# Patient Record
Sex: MALE | Race: WHITE | NOT HISPANIC OR LATINO | Employment: UNEMPLOYED | URBAN - METROPOLITAN AREA
[De-identification: names, ages, dates, MRNs, and addresses within clinical notes are randomized per-mention and may not be internally consistent; named-entity substitution may affect disease eponyms.]

---

## 2023-02-22 LAB
ALANINE AMINOTRANSFERASE (SGPT) (U/L) IN SER/PLAS: 122 U/L (ref 10–52)
ALBUMIN (G/DL) IN SER/PLAS: 4.5 G/DL (ref 3.4–5)
ALKALINE PHOSPHATASE (U/L) IN SER/PLAS: 69 U/L (ref 33–120)
ANION GAP IN SER/PLAS: 10 MMOL/L (ref 10–20)
ASPARTATE AMINOTRANSFERASE (SGOT) (U/L) IN SER/PLAS: 62 U/L (ref 9–39)
BASOPHILS (10*3/UL) IN BLOOD BY AUTOMATED COUNT: 0.06 X10E9/L (ref 0–0.1)
BASOPHILS/100 LEUKOCYTES IN BLOOD BY AUTOMATED COUNT: 0.8 % (ref 0–2)
BILIRUBIN TOTAL (MG/DL) IN SER/PLAS: 1.8 MG/DL (ref 0–1.2)
CALCIDIOL (25 OH VITAMIN D3) (NG/ML) IN SER/PLAS: 20 NG/ML
CALCIUM (MG/DL) IN SER/PLAS: 9.5 MG/DL (ref 8.6–10.3)
CARBON DIOXIDE, TOTAL (MMOL/L) IN SER/PLAS: 29 MMOL/L (ref 21–32)
CHLORIDE (MMOL/L) IN SER/PLAS: 103 MMOL/L (ref 98–107)
CHOLESTEROL (MG/DL) IN SER/PLAS: 135 MG/DL (ref 0–199)
CHOLESTEROL IN HDL (MG/DL) IN SER/PLAS: 32.1 MG/DL
CHOLESTEROL/HDL RATIO: 4.2
COBALAMIN (VITAMIN B12) (PG/ML) IN SER/PLAS: 609 PG/ML (ref 211–911)
CREATININE (MG/DL) IN SER/PLAS: 0.89 MG/DL (ref 0.5–1.3)
EOSINOPHILS (10*3/UL) IN BLOOD BY AUTOMATED COUNT: 0.22 X10E9/L (ref 0–0.7)
EOSINOPHILS/100 LEUKOCYTES IN BLOOD BY AUTOMATED COUNT: 3 % (ref 0–6)
ERYTHROCYTE DISTRIBUTION WIDTH (RATIO) BY AUTOMATED COUNT: 13.2 % (ref 11.5–14.5)
ERYTHROCYTE MEAN CORPUSCULAR HEMOGLOBIN CONCENTRATION (G/DL) BY AUTOMATED: 33.9 G/DL (ref 32–36)
ERYTHROCYTE MEAN CORPUSCULAR VOLUME (FL) BY AUTOMATED COUNT: 83 FL (ref 80–100)
ERYTHROCYTES (10*6/UL) IN BLOOD BY AUTOMATED COUNT: 5.87 X10E12/L (ref 4.5–5.9)
GFR MALE: >90 ML/MIN/1.73M2
GLUCOSE (MG/DL) IN SER/PLAS: 99 MG/DL (ref 74–99)
HEMATOCRIT (%) IN BLOOD BY AUTOMATED COUNT: 48.7 % (ref 41–52)
HEMOGLOBIN (G/DL) IN BLOOD: 16.5 G/DL (ref 13.5–17.5)
IMMATURE GRANULOCYTES/100 LEUKOCYTES IN BLOOD BY AUTOMATED COUNT: 0.1 % (ref 0–0.9)
LDL: 62 MG/DL (ref 0–109)
LEUKOCYTES (10*3/UL) IN BLOOD BY AUTOMATED COUNT: 7.4 X10E9/L (ref 4.4–11.3)
LYMPHOCYTES (10*3/UL) IN BLOOD BY AUTOMATED COUNT: 2.13 X10E9/L (ref 1.2–4.8)
LYMPHOCYTES/100 LEUKOCYTES IN BLOOD BY AUTOMATED COUNT: 28.9 % (ref 13–44)
MONOCYTES (10*3/UL) IN BLOOD BY AUTOMATED COUNT: 0.8 X10E9/L (ref 0.1–1)
MONOCYTES/100 LEUKOCYTES IN BLOOD BY AUTOMATED COUNT: 10.8 % (ref 2–10)
NEUTROPHILS (10*3/UL) IN BLOOD BY AUTOMATED COUNT: 4.16 X10E9/L (ref 1.2–7.7)
NEUTROPHILS/100 LEUKOCYTES IN BLOOD BY AUTOMATED COUNT: 56.4 % (ref 40–80)
NON HDL CHOLESTEROL: 103 MG/DL (ref 0–119)
PLATELETS (10*3/UL) IN BLOOD AUTOMATED COUNT: 326 X10E9/L (ref 150–450)
POTASSIUM (MMOL/L) IN SER/PLAS: 3.8 MMOL/L (ref 3.5–5.3)
PROTEIN TOTAL: 7.5 G/DL (ref 6.4–8.2)
SODIUM (MMOL/L) IN SER/PLAS: 138 MMOL/L (ref 136–145)
THYROTROPIN (MIU/L) IN SER/PLAS BY DETECTION LIMIT <= 0.05 MIU/L: 3.2 MIU/L (ref 0.44–3.98)
TRIGLYCERIDE (MG/DL) IN SER/PLAS: 203 MG/DL (ref 0–149)
UREA NITROGEN (MG/DL) IN SER/PLAS: 16 MG/DL (ref 6–23)
VLDL: 41 MG/DL (ref 0–40)

## 2023-03-10 LAB
BILIRUBIN DIRECT (MG/DL) IN SER/PLAS: 0.2 MG/DL (ref 0–0.3)
FERRITIN (UG/LL) IN SER/PLAS: 167 UG/L (ref 20–300)
HEPATITIS A VIRUS IGM AB PRESENCE IN SER/PLAS BY IMMUNOASSAY: NONREACTIVE
HEPATITIS B VIRUS CORE IGM AB PRESENCE IN SER/PLAS BY IMMUNOASSY: NONREACTIVE
HEPATITIS B VIRUS SURFACE AB (MIU/ML) IN SERUM: <3.1 MIU/ML
HEPATITIS B VIRUS SURFACE AG PRESENCE IN SERUM: NONREACTIVE
HEPATITIS C VIRUS AB PRESENCE IN SERUM: NONREACTIVE
IRON (UG/DL) IN SER/PLAS: 91 UG/DL (ref 35–150)
IRON BINDING CAPACITY (UG/DL) IN SER/PLAS: 313 UG/DL (ref 240–445)
IRON SATURATION (%) IN SER/PLAS: 29 % (ref 25–45)

## 2023-03-13 LAB
AMPHETAMINE (PRESENCE) IN URINE BY SCREEN METHOD: NORMAL
AMPHETAMINES,URINE: NORMAL
BARBITURATES PRESENCE IN URINE BY SCREEN METHOD: NORMAL
BENZODIAZEPINE (PRESENCE) IN URINE BY SCREEN METHOD: NORMAL
CANNABINOIDS IN URINE BY SCREEN METHOD: NORMAL
COCAINE (PRESENCE) IN URINE BY SCREEN METHOD: NORMAL
DRUG SCREEN COMMENT URINE: NORMAL
FENTANYL URINE: NORMAL
MDA,URINE: NORMAL
MDEA,URINE: NORMAL
MDMA,UR: NORMAL
METHADONE (PRESENCE) IN URINE BY SCREEN METHOD: NORMAL
METHAMPHETAMINE QUANTITATIVE URINE: NORMAL
METHYLPHENIDATE URINE QUANTITATIVE: NORMAL
OPIATES (PRESENCE) IN URINE BY SCREEN METHOD: NORMAL
OXYCODONE (PRESENCE) IN URINE BY SCREEN METHOD: NORMAL
PHENCYCLIDINE (PRESENCE) IN URINE BY SCREEN METHOD: NORMAL
PHENTERMINE,UR: NORMAL
RITALINIC ACID URINE: NORMAL

## 2023-03-16 ENCOUNTER — TELEPHONE (OUTPATIENT)
Dept: PRIMARY CARE | Facility: CLINIC | Age: 23
End: 2023-03-16
Payer: COMMERCIAL

## 2023-03-16 DIAGNOSIS — F90.9 ATTENTION DEFICIT HYPERACTIVITY DISORDER (ADHD), UNSPECIFIED ADHD TYPE: ICD-10-CM

## 2023-03-16 DIAGNOSIS — F84.5 ASPERGER'S DISORDER (HHS-HCC): ICD-10-CM

## 2023-03-16 DIAGNOSIS — K76.0 NON-ALCOHOLIC FATTY LIVER DISEASE: Primary | ICD-10-CM

## 2023-03-27 NOTE — TELEPHONE ENCOUNTER
Pts Mom wants Dr MARTIN to know that the drug test was canceled.  We don't know if it was because these drugs will not show in the urine or what happened.  Do you want to try again?

## 2023-03-28 ENCOUNTER — TELEPHONE (OUTPATIENT)
Dept: PRIMARY CARE | Facility: CLINIC | Age: 23
End: 2023-03-28
Payer: COMMERCIAL

## 2023-03-28 NOTE — TELEPHONE ENCOUNTER
----- Message from John Gaspar MD sent at 3/20/2023  9:49 PM EDT -----  Liver US: significant fatty infiltration of the liver is noted, Hepatitis B surface antibody is negative. Clarify if the patient was vaccinated for hepatitis B. Will refer for evaluation of fatty liver since the patient is only 23 years old.

## 2023-04-28 DIAGNOSIS — F90.0 ATTENTION DEFICIT HYPERACTIVITY DISORDER (ADHD), PREDOMINANTLY INATTENTIVE TYPE: Primary | ICD-10-CM

## 2023-05-01 PROBLEM — F90.0 ATTENTION DEFICIT HYPERACTIVITY DISORDER (ADHD), PREDOMINANTLY INATTENTIVE TYPE: Status: ACTIVE | Noted: 2023-05-01

## 2023-05-01 PROBLEM — E66.3 OVERWEIGHT (BMI 25.0-29.9): Status: ACTIVE | Noted: 2023-05-01

## 2023-05-01 PROBLEM — F84.5 ASPERGER'S DISORDER (HHS-HCC): Status: ACTIVE | Noted: 2023-05-01

## 2023-05-01 RX ORDER — DEXMETHYLPHENIDATE HYDROCHLORIDE 10 MG/1
TABLET ORAL
COMMUNITY
Start: 2022-09-01 | End: 2023-05-04 | Stop reason: SDUPTHER

## 2023-05-04 RX ORDER — DEXMETHYLPHENIDATE HYDROCHLORIDE 10 MG/1
TABLET ORAL
Qty: 60 TABLET | Refills: 0 | Status: SHIPPED | OUTPATIENT
Start: 2023-05-04 | End: 2023-06-09

## 2023-05-04 NOTE — TELEPHONE ENCOUNTER
Spoke with patient Mom , patient is on 10 mg of the Focalin.  Patient has been out of med for 3 days.   Drug screen was canceled ,in March, not sure why. She will get patient to the lab to do drug screen as soon as she can , most likely Saturday.    Mom would like to know will this effect the chance of patient getting med refilled.

## 2023-06-07 ENCOUNTER — LAB (OUTPATIENT)
Dept: LAB | Facility: LAB | Age: 23
End: 2023-06-07
Payer: COMMERCIAL

## 2023-06-07 DIAGNOSIS — F84.5 ASPERGER'S DISORDER (HHS-HCC): ICD-10-CM

## 2023-06-07 DIAGNOSIS — F90.9 ATTENTION DEFICIT HYPERACTIVITY DISORDER (ADHD), UNSPECIFIED ADHD TYPE: ICD-10-CM

## 2023-06-07 PROCEDURE — 80324 DRUG SCREEN AMPHETAMINES 1/2: CPT

## 2023-06-07 PROCEDURE — 80360 METHYLPHENIDATE: CPT

## 2023-06-08 DIAGNOSIS — F90.0 ATTENTION DEFICIT HYPERACTIVITY DISORDER (ADHD), PREDOMINANTLY INATTENTIVE TYPE: Primary | ICD-10-CM

## 2023-06-09 RX ORDER — DEXMETHYLPHENIDATE HYDROCHLORIDE 10 MG/1
TABLET ORAL
Qty: 50 TABLET | Refills: 0 | Status: SHIPPED | OUTPATIENT
Start: 2023-06-09 | End: 2023-07-14 | Stop reason: SDUPTHER

## 2023-06-10 LAB
AMPHETAMINES,URINE: <50 NG/ML
MDA,URINE: <200 NG/ML
MDEA,URINE: <200 NG/ML
MDMA,UR: <200 NG/ML
METHAMPHETAMINE QUANTITATIVE URINE: <200 NG/ML
PHENTERMINE,UR: <200 NG/ML

## 2023-06-11 LAB
METHYLPHENIDATE URINE QUANTITATIVE: 874.4 NG/ML
RITALINIC ACID URINE: 3612.2 NG/ML

## 2023-07-06 LAB
ALANINE AMINOTRANSFERASE (SGPT) (U/L) IN SER/PLAS: 87 U/L (ref 10–52)
ALBUMIN (G/DL) IN SER/PLAS: 4.7 G/DL (ref 3.4–5)
ALKALINE PHOSPHATASE (U/L) IN SER/PLAS: 67 U/L (ref 33–120)
ASPARTATE AMINOTRANSFERASE (SGOT) (U/L) IN SER/PLAS: 42 U/L (ref 9–39)
BILIRUBIN DIRECT (MG/DL) IN SER/PLAS: 0.2 MG/DL (ref 0–0.3)
BILIRUBIN TOTAL (MG/DL) IN SER/PLAS: 1.1 MG/DL (ref 0–1.2)
CERULOPLASMIN (MG/DL) IN SER/PLAS: 18 MG/DL (ref 20–60)
HEPATITIS A TOTAL AB INTERPRETATION: REACTIVE
PROTEIN TOTAL: 7.7 G/DL (ref 6.4–8.2)

## 2023-07-07 LAB
ANTI-NUCLEAR ANTIBODY (ANA): NEGATIVE
MITOCHONDRIAL ANTIBODY: NEGATIVE

## 2023-07-10 LAB
ALPHA-1 ANTITRYPSIN PHENOTYPE: NORMAL
ALPHA-1-ANTITRYPSIN (REF): 110 MG/DL (ref 90–200)
ANTI-SMOOTH MUSCLE ANTIBODY: ABNORMAL

## 2023-07-13 ENCOUNTER — TELEPHONE (OUTPATIENT)
Dept: PRIMARY CARE | Facility: CLINIC | Age: 23
End: 2023-07-13
Payer: COMMERCIAL

## 2023-07-13 NOTE — TELEPHONE ENCOUNTER
Patient is requesting a refill of   dexmethylphenidate (Focalin) 10 mg tablet     Pharmacy is  [facility]

## 2023-07-14 DIAGNOSIS — F90.0 ATTENTION DEFICIT HYPERACTIVITY DISORDER (ADHD), PREDOMINANTLY INATTENTIVE TYPE: ICD-10-CM

## 2023-07-14 PROBLEM — K76.0 FATTY LIVER: Status: ACTIVE | Noted: 2023-07-14

## 2023-07-14 PROBLEM — E78.1 HYPERTRIGLYCERIDEMIA: Status: ACTIVE | Noted: 2023-07-14

## 2023-07-14 PROBLEM — R74.8 ELEVATED LIVER ENZYMES: Status: ACTIVE | Noted: 2023-07-14

## 2023-07-14 RX ORDER — DEXMETHYLPHENIDATE HYDROCHLORIDE 10 MG/1
10 TABLET ORAL 2 TIMES DAILY
Qty: 50 TABLET | Refills: 0 | Status: SHIPPED | OUTPATIENT
Start: 2023-07-14 | End: 2023-07-28 | Stop reason: SDUPTHER

## 2023-07-20 LAB
COPPER, 24 HOUR URINE: 7 UG/24 HR (ref 3–35)
COPPER, URINE: 6 UG/L
COPPER/CREATININE RATIO URINE: 9 UG/G CREAT (ref 0–49)
CREATININE URINE (LC): 0.67 G/L (ref 0.3–3)
VOLUME OF URINE (LC): 1100 ML

## 2023-07-28 ENCOUNTER — OFFICE VISIT (OUTPATIENT)
Dept: PRIMARY CARE | Facility: CLINIC | Age: 23
End: 2023-07-28
Payer: COMMERCIAL

## 2023-07-28 ENCOUNTER — LAB (OUTPATIENT)
Dept: LAB | Facility: LAB | Age: 23
End: 2023-07-28
Payer: COMMERCIAL

## 2023-07-28 VITALS
BODY MASS INDEX: 29.29 KG/M2 | WEIGHT: 210 LBS | TEMPERATURE: 96.8 F | HEART RATE: 74 BPM | RESPIRATION RATE: 16 BRPM | DIASTOLIC BLOOD PRESSURE: 75 MMHG | SYSTOLIC BLOOD PRESSURE: 147 MMHG | OXYGEN SATURATION: 97 %

## 2023-07-28 DIAGNOSIS — F90.0 ATTENTION DEFICIT HYPERACTIVITY DISORDER (ADHD), PREDOMINANTLY INATTENTIVE TYPE: ICD-10-CM

## 2023-07-28 DIAGNOSIS — K76.0 FATTY LIVER: ICD-10-CM

## 2023-07-28 DIAGNOSIS — F84.5 ASPERGER'S DISORDER (HHS-HCC): ICD-10-CM

## 2023-07-28 DIAGNOSIS — E78.2 MIXED HYPERLIPIDEMIA: ICD-10-CM

## 2023-07-28 DIAGNOSIS — K76.0 FATTY LIVER: Primary | ICD-10-CM

## 2023-07-28 LAB
CHOLESTEROL (MG/DL) IN SER/PLAS: 150 MG/DL (ref 0–199)
CHOLESTEROL IN HDL (MG/DL) IN SER/PLAS: 34.6 MG/DL
CHOLESTEROL/HDL RATIO: 4.3
LDL: 89 MG/DL (ref 0–119)
NON HDL CHOLESTEROL: 115 MG/DL (ref 0–149)
TRIGLYCERIDE (MG/DL) IN SER/PLAS: 131 MG/DL (ref 0–149)
VLDL: 26 MG/DL (ref 0–40)

## 2023-07-28 PROCEDURE — 80061 LIPID PANEL: CPT

## 2023-07-28 PROCEDURE — 99214 OFFICE O/P EST MOD 30 MIN: CPT | Performed by: INTERNAL MEDICINE

## 2023-07-28 PROCEDURE — 36415 COLL VENOUS BLD VENIPUNCTURE: CPT

## 2023-07-28 PROCEDURE — 1036F TOBACCO NON-USER: CPT | Performed by: INTERNAL MEDICINE

## 2023-07-28 RX ORDER — DEXMETHYLPHENIDATE HYDROCHLORIDE 10 MG/1
10 TABLET ORAL 2 TIMES DAILY
Qty: 150 TABLET | Refills: 0 | Status: SHIPPED | OUTPATIENT
Start: 2023-08-11 | End: 2023-09-22 | Stop reason: SDUPTHER

## 2023-07-28 SDOH — ECONOMIC STABILITY: FOOD INSECURITY: WITHIN THE PAST 12 MONTHS, YOU WORRIED THAT YOUR FOOD WOULD RUN OUT BEFORE YOU GOT MONEY TO BUY MORE.: NEVER TRUE

## 2023-07-28 SDOH — ECONOMIC STABILITY: FOOD INSECURITY: WITHIN THE PAST 12 MONTHS, THE FOOD YOU BOUGHT JUST DIDN'T LAST AND YOU DIDN'T HAVE MONEY TO GET MORE.: NEVER TRUE

## 2023-07-28 ASSESSMENT — LIFESTYLE VARIABLES
HOW OFTEN DO YOU HAVE A DRINK CONTAINING ALCOHOL: NEVER
HOW OFTEN DO YOU HAVE SIX OR MORE DRINKS ON ONE OCCASION: NEVER
HOW MANY STANDARD DRINKS CONTAINING ALCOHOL DO YOU HAVE ON A TYPICAL DAY: PATIENT DOES NOT DRINK
AUDIT-C TOTAL SCORE: 0
SKIP TO QUESTIONS 9-10: 1

## 2023-07-28 ASSESSMENT — PAIN SCALES - GENERAL: PAINLEVEL: 0-NO PAIN

## 2023-07-28 ASSESSMENT — PATIENT HEALTH QUESTIONNAIRE - PHQ9
1. LITTLE INTEREST OR PLEASURE IN DOING THINGS: NOT AT ALL
SUM OF ALL RESPONSES TO PHQ9 QUESTIONS 1 & 2: 0
2. FEELING DOWN, DEPRESSED OR HOPELESS: NOT AT ALL

## 2023-07-28 NOTE — PROGRESS NOTES
Subjective   Patient ID: Hector Cruz is a 23 y.o. male who presents for ADHD.    HPI    Patient presents for a follow up visit. He was last seen in February 2023.     Asperger disorder, ADHD: patient used to see Santosh Ivy in peds psychiatry but he has aged out, he takes dexmethylphenidate twice daily.     Elevated BP: Previously concerned with elevated BP readings at psych office visits. BP today in the office is 147/75.      Elevated AST, ALT and bilirubin: Discovered on recent lab work with , AST 62, and bili 1.8. Rechecked on 7/6/23 which showed ALT 87, AST 42 and total bilirubin 1.1. Mother notes history of fatty liver that was diagnosed when he was young. He was previously on Abilify and other psych meds which were thought to be contributing to elevated liver enzymes in the past. However, these meds were discontinued. He was sent for a hepatitis panel as well as an ultrasound of the liver last visit. His hepatitis panel was nonreactive. Though his hep A total Ab was reactive on 7/6/23. Ultrasound showed hepatomegaly with significant fatty infiltration of the liver.  His iron studies were within normal limits. Patient has seen Dr Tovar x 1 (hepatology), he had an slightly low  ceruloplasmin, he has had an ophthalmology evaluation which showed no evidence of Patrick's disease. The patient has a fibro scan ordered per Dr Tovar prior to follow up with him.     Hypertriglyceridemia: TG was 203. Total cholesterol was 135 and LDL was 62. Fasting glucose was normal. Lipid panel has not been checked since that visit.     Anti-smooth muscle antibody positive: Recent lab work showed negative DENNISE and antimitochondrial antibody but positive anti-smooth muscle antibody.   He is following up with hepatology.      Vitamin D deficiency: Vitamin D level of 20. Advised to take supplements at last visit. He does not take vitamin d      Review of Systems  In addition to that documented in the HPI above, the  additional ROS was obtained:  Constitutional: Denies fevers or chills  Eyes: Denies vision changes  ENMT: Denies trouble swallowing  Cardiovascular: Denies chest pain or heart racing  Respiratory: Denies SOB or cough  Gastrointestinal: Denies nausea, vomiting, diarrhea or constipation  Musculoskeletal: Denies joint pain or joint swelling  Neuro: denies frequent headaches or dizziness  Psych: denies depression or anxiety , history of Asperger's and ADHD, takes Focalin     Objective     Visit Vitals  /75 (BP Location: Left arm, Patient Position: Sitting, BP Cuff Size: Adult)   Pulse 74   Temp 36 °C (96.8 °F) (Temporal)   Resp 16   Wt 95.3 kg (210 lb)   SpO2 97%   BMI 29.29 kg/m²   Smoking Status Never   BSA 2.18 m²      Physical Exam  CONSTITUTIONAL - well nourished, well developed, looks like stated age, in no acute distress, not ill-appearing, and not tired appearing, and accompanied by mother and grandmother  SKIN - normal skin color and pigmentation, normal skin turgor without rash, lesions, or nodules visualized  HEAD - no trauma, normocephalic  EYES - extraocular muscles are intact, and normal external exam, no icterus noted  NECK - supple without rigidity, no neck mass was observed, no thyromegaly or thyroid nodules, no neck masses  CHEST - clear to auscultation, no wheezing, no crackles and no rales, good effort  CARDIAC - regular rate and regular rhythm, no skipped beats, no murmur, no carotid bruits noted  EXTREMITIES - no edema, no deformities  NEUROLOGICAL -alert, oriented and no focal signs  PSYCHIATRIC - alert, pleasant and cordial, age-appropriate  IMMUNOLOGIC - no cervical lymphadenopathy    Health Maintenance Due   Topic Date Due    HIV Screening  Never done    Hearing Screening (1) Never done    Well Child Visit (WCV) - Annual  Never done    COVID-19 Vaccine (3 - Booster for Pfizer series) 10/05/2021        Assessment/Plan   Problem List Items Addressed This Visit       Asperger's disorder      Patient only takes meds for ADHD now         Relevant Orders    Lipid panel    Attention deficit hyperactivity disorder (ADHD), predominantly inattentive type     Continue current dose of dexmethylphenidate. I have personally reviewed the OARRS report.  This report is scanned into the electronic medical record.  I have considered the risks of abuse, dependence, addiction and diversion.  I believe that it is clinically appropriate to prescribe this medication. John Gaspar MD          Relevant Medications    dexmethylphenidate (Focalin) 10 mg tablet (Start on 8/11/2023)    Other Relevant Orders    Lipid panel    Mixed hyperlipidemia     TG levels have been elevated, recheck labs, this may contribute to fatty liver also         Relevant Orders    Lipid panel    Fatty liver - Primary     He does see hepatology, a fibroscan is scheduled to be completed prior to follow up by Dr Tovar         Relevant Orders    Lipid panel   Check lipid panel only, other labs an Fibro scan ordered by Dr Tovar, may follow up in 6 months after liver issues are evaluated.

## 2023-07-28 NOTE — ASSESSMENT & PLAN NOTE
Continue current dose of dexmethylphenidate. I have personally reviewed the OARRS report.  This report is scanned into the electronic medical record.  I have considered the risks of abuse, dependence, addiction and diversion.  I believe that it is clinically appropriate to prescribe this medication. John Gaspar MD

## 2023-09-22 ENCOUNTER — TELEPHONE (OUTPATIENT)
Dept: PRIMARY CARE | Facility: CLINIC | Age: 23
End: 2023-09-22
Payer: COMMERCIAL

## 2023-09-22 DIAGNOSIS — F90.0 ATTENTION DEFICIT HYPERACTIVITY DISORDER (ADHD), PREDOMINANTLY INATTENTIVE TYPE: ICD-10-CM

## 2023-09-22 NOTE — TELEPHONE ENCOUNTER
Patient is calling for a refill of dexmethylphenidate (Focalin) 10 mg tablet    [facility] - [place], [place] - [address] [address] [facility] [id number]

## 2023-09-25 RX ORDER — DEXMETHYLPHENIDATE HYDROCHLORIDE 10 MG/1
10 TABLET ORAL 2 TIMES DAILY
Qty: 60 TABLET | Refills: 0 | Status: SHIPPED | OUTPATIENT
Start: 2023-09-25 | End: 2023-09-25

## 2023-11-01 ENCOUNTER — PHARMACY VISIT (OUTPATIENT)
Dept: PHARMACY | Facility: CLINIC | Age: 23
End: 2023-11-01
Payer: COMMERCIAL

## 2023-11-01 ENCOUNTER — TELEPHONE (OUTPATIENT)
Dept: PRIMARY CARE | Facility: CLINIC | Age: 23
End: 2023-11-01
Payer: COMMERCIAL

## 2023-11-01 DIAGNOSIS — F90.0 ATTENTION DEFICIT HYPERACTIVITY DISORDER (ADHD), PREDOMINANTLY INATTENTIVE TYPE: Primary | ICD-10-CM

## 2023-11-01 PROCEDURE — RXMED WILLOW AMBULATORY MEDICATION CHARGE

## 2023-11-01 RX ORDER — DEXMETHYLPHENIDATE HYDROCHLORIDE 10 MG/1
10 TABLET ORAL 2 TIMES DAILY
Qty: 60 TABLET | Refills: 0 | Status: SHIPPED | OUTPATIENT
Start: 2023-11-01 | End: 2023-12-08 | Stop reason: SDUPTHER

## 2023-12-08 DIAGNOSIS — F90.0 ATTENTION DEFICIT HYPERACTIVITY DISORDER (ADHD), PREDOMINANTLY INATTENTIVE TYPE: ICD-10-CM

## 2023-12-11 PROCEDURE — RXMED WILLOW AMBULATORY MEDICATION CHARGE

## 2023-12-11 RX ORDER — DEXMETHYLPHENIDATE HYDROCHLORIDE 10 MG/1
10 TABLET ORAL 2 TIMES DAILY
Qty: 60 TABLET | Refills: 0 | Status: SHIPPED | OUTPATIENT
Start: 2023-12-11 | End: 2024-01-26 | Stop reason: SDUPTHER

## 2023-12-13 ENCOUNTER — PHARMACY VISIT (OUTPATIENT)
Dept: PHARMACY | Facility: CLINIC | Age: 23
End: 2023-12-13
Payer: COMMERCIAL

## 2024-01-24 DIAGNOSIS — F90.0 ATTENTION DEFICIT HYPERACTIVITY DISORDER (ADHD), PREDOMINANTLY INATTENTIVE TYPE: ICD-10-CM

## 2024-01-24 RX ORDER — DEXMETHYLPHENIDATE HYDROCHLORIDE 10 MG/1
10 TABLET ORAL 2 TIMES DAILY
Qty: 60 TABLET | Refills: 0 | Status: CANCELLED | OUTPATIENT
Start: 2024-01-24 | End: 2024-07-26

## 2024-01-26 ENCOUNTER — PHARMACY VISIT (OUTPATIENT)
Dept: PHARMACY | Facility: CLINIC | Age: 24
End: 2024-01-26
Payer: COMMERCIAL

## 2024-01-26 DIAGNOSIS — F90.0 ATTENTION DEFICIT HYPERACTIVITY DISORDER (ADHD), PREDOMINANTLY INATTENTIVE TYPE: ICD-10-CM

## 2024-01-26 PROCEDURE — RXMED WILLOW AMBULATORY MEDICATION CHARGE

## 2024-01-26 RX ORDER — DEXMETHYLPHENIDATE HYDROCHLORIDE 10 MG/1
10 TABLET ORAL 2 TIMES DAILY
Qty: 60 TABLET | Refills: 0 | Status: SHIPPED | OUTPATIENT
Start: 2024-01-26 | End: 2024-02-02 | Stop reason: SDUPTHER

## 2024-02-02 ENCOUNTER — OFFICE VISIT (OUTPATIENT)
Dept: PRIMARY CARE | Facility: CLINIC | Age: 24
End: 2024-02-02
Payer: COMMERCIAL

## 2024-02-02 ENCOUNTER — LAB (OUTPATIENT)
Dept: LAB | Facility: LAB | Age: 24
End: 2024-02-02
Payer: COMMERCIAL

## 2024-02-02 VITALS
HEIGHT: 71 IN | OXYGEN SATURATION: 99 % | BODY MASS INDEX: 30.1 KG/M2 | WEIGHT: 215 LBS | SYSTOLIC BLOOD PRESSURE: 136 MMHG | DIASTOLIC BLOOD PRESSURE: 86 MMHG | HEART RATE: 88 BPM | TEMPERATURE: 97.8 F | RESPIRATION RATE: 18 BRPM

## 2024-02-02 DIAGNOSIS — E78.2 MIXED HYPERLIPIDEMIA: Primary | ICD-10-CM

## 2024-02-02 DIAGNOSIS — F90.0 ATTENTION DEFICIT HYPERACTIVITY DISORDER (ADHD), PREDOMINANTLY INATTENTIVE TYPE: ICD-10-CM

## 2024-02-02 DIAGNOSIS — E78.2 MIXED HYPERLIPIDEMIA: ICD-10-CM

## 2024-02-02 DIAGNOSIS — R94.6 ABNORMAL THYROID FUNCTION TEST: ICD-10-CM

## 2024-02-02 DIAGNOSIS — K76.0 FATTY LIVER: ICD-10-CM

## 2024-02-02 DIAGNOSIS — J45.909 UNCOMPLICATED ASTHMA, UNSPECIFIED ASTHMA SEVERITY, UNSPECIFIED WHETHER PERSISTENT (HHS-HCC): ICD-10-CM

## 2024-02-02 DIAGNOSIS — R79.89 LOW VITAMIN D LEVEL: ICD-10-CM

## 2024-02-02 DIAGNOSIS — F84.5 ASPERGER'S DISORDER (HHS-HCC): ICD-10-CM

## 2024-02-02 LAB
25(OH)D3 SERPL-MCNC: 27 NG/ML (ref 30–100)
ALBUMIN SERPL BCP-MCNC: 4.5 G/DL (ref 3.4–5)
ALP SERPL-CCNC: 69 U/L (ref 33–120)
ALT SERPL W P-5'-P-CCNC: 82 U/L (ref 10–52)
ANION GAP SERPL CALC-SCNC: 12 MMOL/L (ref 10–20)
AST SERPL W P-5'-P-CCNC: 42 U/L (ref 9–39)
BASOPHILS # BLD AUTO: 0.06 X10*3/UL (ref 0–0.1)
BASOPHILS NFR BLD AUTO: 0.9 %
BILIRUB SERPL-MCNC: 1.3 MG/DL (ref 0–1.2)
BUN SERPL-MCNC: 18 MG/DL (ref 6–23)
CALCIUM SERPL-MCNC: 9.6 MG/DL (ref 8.6–10.3)
CHLORIDE SERPL-SCNC: 106 MMOL/L (ref 98–107)
CHOLEST SERPL-MCNC: 146 MG/DL (ref 0–199)
CHOLESTEROL/HDL RATIO: 4.1
CO2 SERPL-SCNC: 24 MMOL/L (ref 21–32)
CREAT SERPL-MCNC: 0.86 MG/DL (ref 0.5–1.3)
EGFRCR SERPLBLD CKD-EPI 2021: >90 ML/MIN/1.73M*2
EOSINOPHIL # BLD AUTO: 0.1 X10*3/UL (ref 0–0.7)
EOSINOPHIL NFR BLD AUTO: 1.5 %
ERYTHROCYTE [DISTWIDTH] IN BLOOD BY AUTOMATED COUNT: 14 % (ref 11.5–14.5)
GLUCOSE SERPL-MCNC: 98 MG/DL (ref 74–99)
HCT VFR BLD AUTO: 47.8 % (ref 41–52)
HDLC SERPL-MCNC: 35.9 MG/DL
HGB BLD-MCNC: 15.9 G/DL (ref 13.5–17.5)
IMM GRANULOCYTES # BLD AUTO: 0.02 X10*3/UL (ref 0–0.7)
IMM GRANULOCYTES NFR BLD AUTO: 0.3 % (ref 0–0.9)
LDLC SERPL CALC-MCNC: 85 MG/DL
LYMPHOCYTES # BLD AUTO: 1.81 X10*3/UL (ref 1.2–4.8)
LYMPHOCYTES NFR BLD AUTO: 27.7 %
MCH RBC QN AUTO: 27.9 PG (ref 26–34)
MCHC RBC AUTO-ENTMCNC: 33.3 G/DL (ref 32–36)
MCV RBC AUTO: 84 FL (ref 80–100)
MONOCYTES # BLD AUTO: 0.62 X10*3/UL (ref 0.1–1)
MONOCYTES NFR BLD AUTO: 9.5 %
NEUTROPHILS # BLD AUTO: 3.93 X10*3/UL (ref 1.2–7.7)
NEUTROPHILS NFR BLD AUTO: 60.1 %
NON HDL CHOLESTEROL: 110 MG/DL (ref 0–149)
NRBC BLD-RTO: 0 /100 WBCS (ref 0–0)
PLATELET # BLD AUTO: 328 X10*3/UL (ref 150–450)
POTASSIUM SERPL-SCNC: 4.3 MMOL/L (ref 3.5–5.3)
PROT SERPL-MCNC: 7.4 G/DL (ref 6.4–8.2)
RBC # BLD AUTO: 5.69 X10*6/UL (ref 4.5–5.9)
SODIUM SERPL-SCNC: 138 MMOL/L (ref 136–145)
TRIGL SERPL-MCNC: 127 MG/DL (ref 0–149)
TSH SERPL-ACNC: 2.58 MIU/L (ref 0.44–3.98)
VLDL: 25 MG/DL (ref 0–40)
WBC # BLD AUTO: 6.5 X10*3/UL (ref 4.4–11.3)

## 2024-02-02 PROCEDURE — 85025 COMPLETE CBC W/AUTO DIFF WBC: CPT

## 2024-02-02 PROCEDURE — 99214 OFFICE O/P EST MOD 30 MIN: CPT | Performed by: INTERNAL MEDICINE

## 2024-02-02 PROCEDURE — 82306 VITAMIN D 25 HYDROXY: CPT

## 2024-02-02 PROCEDURE — 36415 COLL VENOUS BLD VENIPUNCTURE: CPT

## 2024-02-02 PROCEDURE — 80061 LIPID PANEL: CPT

## 2024-02-02 PROCEDURE — 1036F TOBACCO NON-USER: CPT | Performed by: INTERNAL MEDICINE

## 2024-02-02 PROCEDURE — 80053 COMPREHEN METABOLIC PANEL: CPT

## 2024-02-02 PROCEDURE — 84443 ASSAY THYROID STIM HORMONE: CPT

## 2024-02-02 RX ORDER — DEXMETHYLPHENIDATE HYDROCHLORIDE 10 MG/1
10 TABLET ORAL 2 TIMES DAILY
Qty: 180 TABLET | Refills: 0 | Status: SHIPPED | OUTPATIENT
Start: 2024-02-26 | End: 2024-04-10 | Stop reason: SDUPTHER

## 2024-02-02 SDOH — ECONOMIC STABILITY: FOOD INSECURITY: WITHIN THE PAST 12 MONTHS, THE FOOD YOU BOUGHT JUST DIDN'T LAST AND YOU DIDN'T HAVE MONEY TO GET MORE.: NEVER TRUE

## 2024-02-02 SDOH — ECONOMIC STABILITY: FOOD INSECURITY: WITHIN THE PAST 12 MONTHS, YOU WORRIED THAT YOUR FOOD WOULD RUN OUT BEFORE YOU GOT MONEY TO BUY MORE.: NEVER TRUE

## 2024-02-02 ASSESSMENT — LIFESTYLE VARIABLES
HOW OFTEN DO YOU HAVE SIX OR MORE DRINKS ON ONE OCCASION: NEVER
HOW OFTEN DO YOU HAVE A DRINK CONTAINING ALCOHOL: NEVER
AUDIT-C TOTAL SCORE: 0
SKIP TO QUESTIONS 9-10: 1
HOW MANY STANDARD DRINKS CONTAINING ALCOHOL DO YOU HAVE ON A TYPICAL DAY: PATIENT DOES NOT DRINK

## 2024-02-02 ASSESSMENT — PAIN SCALES - GENERAL: PAINLEVEL: 0-NO PAIN

## 2024-02-02 ASSESSMENT — PATIENT HEALTH QUESTIONNAIRE - PHQ9
SUM OF ALL RESPONSES TO PHQ9 QUESTIONS 1 & 2: 0
2. FEELING DOWN, DEPRESSED OR HOPELESS: NOT AT ALL
1. LITTLE INTEREST OR PLEASURE IN DOING THINGS: NOT AT ALL

## 2024-02-02 NOTE — PROGRESS NOTES
Chief Complaint/HPI:    Asperger disorder, ADHD: patient used to see Santosh Ivy in [facility] psychiatry but he has aged out, he takes dexmethylphenidate twice daily.      Elevated BP: Previously concerned with elevated BP readings at psych office visits. BP today in the office is 147/75.      Elevated AST, ALT and bilirubin: Patient had testing completed by hepatology. He had labs completed.   His iron studies were within normal limits. Patient has seen Dr Tovar x 1 (hepatology), he had an slightly low  ceruloplasmin, He also had an elevated anti smooth muscle antibody, he  has had an ophthalmology evaluation which showed no evidence of Patrick's disease. The patient has a fibro scan ordered per Dr Tovar. It was completed and mother states that results were reviewed     Hypertriglyceridemia: patient takes no med therapy, labs appeared to be normal in 7/2023, no labs have been checked since that time     Anti-smooth muscle antibody positive: Recent lab work showed negative DENNISE and antimitochondrial antibody but positive anti-smooth muscle antibody.   He followed  up with hepatology, fibro scan was completed.      Vitamin D deficiency: Vitamin D level of 20. Advised to take supplements at last visit. He does not take vitamin d    Patient declines flu vaccine today    ROS otherwise negative aside from what was mentioned above in HPI.      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      MEDICATIONS  Current Outpatient Medications on File Prior to Visit   Medication Sig Dispense Refill    dexmethylphenidate (Focalin) 10 mg tablet TAKE 1 TABLET BY MOUTH TWO TIMES A DAY 60 tablet 0     No current facility-administered medications on file prior to visit.         PHYSICAL EXAM  /86 (BP Location: Left arm, Patient Position: Sitting, BP Cuff Size: Large adult)   Pulse 88   Temp 36.6 °C (97.8 °F)   Resp 18   Ht 1.803 m (5' 11\")   Wt 97.5 kg (215 lb)   SpO2 99%   BMI 29.99 kg/m²   Body mass index is 29.99 kg/m².  CONSTITUTIONAL - well nourished, well developed, looks like stated age, in no acute distress, not ill-appearing, and not tired appearing, and accompanied by mother  SKIN - normal skin color and pigmentation, normal skin turgor, a few facial  lesions consistent with acne noted, no nodules visualized  HEAD - no trauma, normocephalic  EYES - extraocular muscles are intact, and normal external exam, no icterus noted  NECK - supple without rigidity, no neck mass was observed, no thyromegaly or thyroid nodules, no neck masses  CHEST - clear to auscultation, no wheezing, no crackles and no rales, good effort  CARDIAC - regular rate and regular rhythm, no skipped beats, no murmur, no carotid bruits noted  EXTREMITIES - no edema, no deformities  NEUROLOGICAL -alert, oriented and no focal signs  PSYCHIATRIC - alert, pleasant and cordial, age-appropriate  IMMUNOLOGIC - no cervical lymphadenopathy    ASSESSMENT/PLAN  Problem List Items Addressed This Visit       Asperger's disorder    Relevant Orders    Comprehensive Metabolic Panel    CBC and Auto Differential    Attention deficit hyperactivity disorder (ADHD), predominantly inattentive type    Current Assessment & Plan     Continue Focalin " therapy as ordered         Relevant Orders    Comprehensive Metabolic Panel    CBC and Auto Differential    Abnormal thyroid function test    Relevant Orders    TSH with reflex to Free T4 if abnormal    Asthma    Overview     Formatting of this note might be different from the original. Inactive code replaced with active code This term is a replacement for an inactive term         Relevant Orders    Comprehensive Metabolic Panel    CBC and Auto Differential    Mixed hyperlipidemia - Primary    Current Assessment & Plan     Recheck labs, last lab testing appeared to be stable         Relevant Orders    Comprehensive Metabolic Panel    Lipid Panel    CBC and Auto Differential    Fatty liver    Current Assessment & Plan     Recheck lab studies, patient has already seen hepatology for assessment.         Relevant Orders    Comprehensive Metabolic Panel    CBC and Auto Differential     Other Visit Diagnoses       Low vitamin D level        Relevant Orders    Vitamin D 25-Hydroxy,Total (for eval of Vitamin D levels)    CBC and Auto Differential            Check labs as ordered, patient does not want a flu vaccine, no med changes today, patient is clinically stable. I have personally reviewed the OARRS report.  This report is scanned into the electronic medical record.  I have considered the risks of abuse, dependence, addiction and diversion.  I believe that it is clinically appropriate to prescribe this medication. A 90 day supply of Focalin is ordered to be filled next month as scheduled. MD John Mercado MD

## 2024-03-04 ENCOUNTER — PHARMACY VISIT (OUTPATIENT)
Dept: PHARMACY | Facility: CLINIC | Age: 24
End: 2024-03-04
Payer: COMMERCIAL

## 2024-03-04 PROCEDURE — RXMED WILLOW AMBULATORY MEDICATION CHARGE

## 2024-04-10 DIAGNOSIS — F90.0 ATTENTION DEFICIT HYPERACTIVITY DISORDER (ADHD), PREDOMINANTLY INATTENTIVE TYPE: ICD-10-CM

## 2024-04-10 RX ORDER — DEXMETHYLPHENIDATE HYDROCHLORIDE 10 MG/1
10 TABLET ORAL 2 TIMES DAILY
Qty: 60 TABLET | Refills: 0 | Status: SHIPPED | OUTPATIENT
Start: 2024-04-10 | End: 2024-05-20 | Stop reason: SDUPTHER

## 2024-04-11 ENCOUNTER — PHARMACY VISIT (OUTPATIENT)
Dept: PHARMACY | Facility: CLINIC | Age: 24
End: 2024-04-11
Payer: COMMERCIAL

## 2024-04-11 PROCEDURE — RXMED WILLOW AMBULATORY MEDICATION CHARGE

## 2024-05-20 DIAGNOSIS — F90.0 ATTENTION DEFICIT HYPERACTIVITY DISORDER (ADHD), PREDOMINANTLY INATTENTIVE TYPE: ICD-10-CM

## 2024-05-20 RX ORDER — DEXMETHYLPHENIDATE HYDROCHLORIDE 10 MG/1
10 TABLET ORAL 2 TIMES DAILY
Qty: 60 TABLET | Refills: 0 | Status: SHIPPED | OUTPATIENT
Start: 2024-05-20 | End: 2024-11-20

## 2024-05-21 ENCOUNTER — PHARMACY VISIT (OUTPATIENT)
Dept: PHARMACY | Facility: CLINIC | Age: 24
End: 2024-05-21
Payer: COMMERCIAL

## 2024-05-21 PROCEDURE — RXMED WILLOW AMBULATORY MEDICATION CHARGE

## 2024-07-01 DIAGNOSIS — F90.0 ATTENTION DEFICIT HYPERACTIVITY DISORDER (ADHD), PREDOMINANTLY INATTENTIVE TYPE: ICD-10-CM

## 2024-07-01 RX ORDER — DEXMETHYLPHENIDATE HYDROCHLORIDE 10 MG/1
10 TABLET ORAL 2 TIMES DAILY
Qty: 60 TABLET | Refills: 0 | Status: CANCELLED | OUTPATIENT
Start: 2024-07-01 | End: 2025-01-01

## 2024-07-03 ENCOUNTER — TELEPHONE (OUTPATIENT)
Dept: PRIMARY CARE | Facility: CLINIC | Age: 24
End: 2024-07-03
Payer: COMMERCIAL

## 2024-07-03 DIAGNOSIS — F90.0 ATTENTION DEFICIT HYPERACTIVITY DISORDER (ADHD), PREDOMINANTLY INATTENTIVE TYPE: ICD-10-CM

## 2024-07-03 RX ORDER — DEXMETHYLPHENIDATE HYDROCHLORIDE 10 MG/1
10 TABLET ORAL 2 TIMES DAILY
Qty: 60 TABLET | Refills: 0 | Status: SHIPPED | OUTPATIENT
Start: 2024-07-03 | End: 2025-01-03

## 2024-07-03 NOTE — TELEPHONE ENCOUNTER
Pt called in requesting a refill of Focalin, pt is currently out     Pt's pharmacy is [facility]

## 2024-07-05 ENCOUNTER — PHARMACY VISIT (OUTPATIENT)
Dept: PHARMACY | Facility: CLINIC | Age: 24
End: 2024-07-05
Payer: COMMERCIAL

## 2024-07-05 PROCEDURE — RXMED WILLOW AMBULATORY MEDICATION CHARGE

## 2024-08-09 ENCOUNTER — APPOINTMENT (OUTPATIENT)
Dept: PRIMARY CARE | Facility: CLINIC | Age: 24
End: 2024-08-09
Payer: COMMERCIAL

## 2024-08-09 VITALS
HEIGHT: 72 IN | RESPIRATION RATE: 14 BRPM | WEIGHT: 225 LBS | HEART RATE: 78 BPM | DIASTOLIC BLOOD PRESSURE: 74 MMHG | OXYGEN SATURATION: 98 % | TEMPERATURE: 97.8 F | BODY MASS INDEX: 30.48 KG/M2 | SYSTOLIC BLOOD PRESSURE: 122 MMHG

## 2024-08-09 DIAGNOSIS — E78.2 MIXED HYPERLIPIDEMIA: ICD-10-CM

## 2024-08-09 DIAGNOSIS — R74.8 ELEVATED LIVER ENZYMES: ICD-10-CM

## 2024-08-09 DIAGNOSIS — E55.9 VITAMIN D DEFICIENCY: ICD-10-CM

## 2024-08-09 DIAGNOSIS — K76.0 FATTY LIVER: Primary | ICD-10-CM

## 2024-08-09 DIAGNOSIS — Z79.899 MEDICATION MANAGEMENT: ICD-10-CM

## 2024-08-09 DIAGNOSIS — F90.0 ATTENTION DEFICIT HYPERACTIVITY DISORDER (ADHD), PREDOMINANTLY INATTENTIVE TYPE: ICD-10-CM

## 2024-08-09 PROBLEM — E66.9 OBESITY (BMI 30.0-34.9): Status: ACTIVE | Noted: 2023-05-01

## 2024-08-09 PROBLEM — E66.811 OBESITY (BMI 30.0-34.9): Status: ACTIVE | Noted: 2023-05-01

## 2024-08-09 PROCEDURE — 99214 OFFICE O/P EST MOD 30 MIN: CPT | Performed by: INTERNAL MEDICINE

## 2024-08-09 PROCEDURE — 80324 DRUG SCREEN AMPHETAMINES 1/2: CPT

## 2024-08-09 PROCEDURE — 80307 DRUG TEST PRSMV CHEM ANLYZR: CPT

## 2024-08-09 PROCEDURE — 1036F TOBACCO NON-USER: CPT | Performed by: INTERNAL MEDICINE

## 2024-08-09 PROCEDURE — 3008F BODY MASS INDEX DOCD: CPT | Performed by: INTERNAL MEDICINE

## 2024-08-09 ASSESSMENT — PATIENT HEALTH QUESTIONNAIRE - PHQ9
2. FEELING DOWN, DEPRESSED OR HOPELESS: NOT AT ALL
SUM OF ALL RESPONSES TO PHQ9 QUESTIONS 1 & 2: 0
1. LITTLE INTEREST OR PLEASURE IN DOING THINGS: NOT AT ALL

## 2024-08-09 ASSESSMENT — PAIN SCALES - GENERAL: PAINLEVEL: 0-NO PAIN

## 2024-08-09 NOTE — ASSESSMENT & PLAN NOTE
Labs appear to be controlled, recheck labs studies. Patient drinks up to one gallon of 2% milk daily, advise to change to nonfat milk if possible. This may help with weight and with fat intake

## 2024-08-09 NOTE — ASSESSMENT & PLAN NOTE
Recheck labs now, will also refer to hepatology for follow up, I suspect that the patient has PMIENTEL primarily as listed , avoid 2% milk

## 2024-08-10 LAB
AMPHETAMINES UR QL SCN: NORMAL
BARBITURATES UR QL SCN: NORMAL
BENZODIAZ UR QL SCN: NORMAL
BZE UR QL SCN: NORMAL
CANNABINOIDS UR QL SCN: NORMAL
FENTANYL+NORFENTANYL UR QL SCN: NORMAL
METHADONE UR QL SCN: NORMAL
OPIATES UR QL SCN: NORMAL
OXYCODONE+OXYMORPHONE UR QL SCN: NORMAL
PCP UR QL SCN: NORMAL

## 2024-08-14 ENCOUNTER — TELEPHONE (OUTPATIENT)
Dept: PRIMARY CARE | Facility: CLINIC | Age: 24
End: 2024-08-14
Payer: COMMERCIAL

## 2024-08-14 DIAGNOSIS — F90.0 ATTENTION DEFICIT HYPERACTIVITY DISORDER (ADHD), PREDOMINANTLY INATTENTIVE TYPE: ICD-10-CM

## 2024-08-14 DIAGNOSIS — F84.5 ASPERGER'S DISORDER (HHS-HCC): Primary | ICD-10-CM

## 2024-08-14 RX ORDER — DEXMETHYLPHENIDATE HYDROCHLORIDE 10 MG/1
10 TABLET ORAL 2 TIMES DAILY
Qty: 60 TABLET | Refills: 0 | Status: SHIPPED | OUTPATIENT
Start: 2024-08-14 | End: 2025-02-14

## 2024-08-14 NOTE — TELEPHONE ENCOUNTER
Patient mom called in stating that the drug screen that was ordered was incorrect for is controlled med. Patient mom states that the METHYLPHENIDATE cannot be detected in a routine drug screen. Would you be able to put in a new order for him to get a new one?    [facility]    Pharmacy: [facility]

## 2024-08-15 ENCOUNTER — PHARMACY VISIT (OUTPATIENT)
Dept: PHARMACY | Facility: CLINIC | Age: 24
End: 2024-08-15
Payer: COMMERCIAL

## 2024-08-15 PROCEDURE — RXMED WILLOW AMBULATORY MEDICATION CHARGE

## 2024-08-17 ENCOUNTER — LAB (OUTPATIENT)
Dept: LAB | Facility: LAB | Age: 24
End: 2024-08-17
Payer: COMMERCIAL

## 2024-08-17 DIAGNOSIS — F90.0 ATTENTION DEFICIT HYPERACTIVITY DISORDER (ADHD), PREDOMINANTLY INATTENTIVE TYPE: ICD-10-CM

## 2024-08-17 DIAGNOSIS — F84.5 ASPERGER'S DISORDER (HHS-HCC): ICD-10-CM

## 2024-08-17 PROCEDURE — 80360 METHYLPHENIDATE: CPT

## 2024-08-20 LAB
AMPHET UR-MCNC: <50 NG/ML
MDA UR-MCNC: <200 NG/ML
MDEA UR-MCNC: <200 NG/ML
MDMA UR-MCNC: <200 NG/ML
METHAMPHET UR-MCNC: <200 NG/ML
PHENTERMINE UR CFM-MCNC: <200 NG/ML

## 2024-08-23 ENCOUNTER — TELEPHONE (OUTPATIENT)
Dept: PRIMARY CARE | Facility: CLINIC | Age: 24
End: 2024-08-23

## 2024-08-24 LAB
ME-PHENIDATE UR-MCNC: 1245.7 NG/ML
PPAA UR-MCNC: >5000 NG/ML

## 2024-09-20 ENCOUNTER — PHARMACY VISIT (OUTPATIENT)
Dept: PHARMACY | Facility: CLINIC | Age: 24
End: 2024-09-20
Payer: COMMERCIAL

## 2024-09-20 DIAGNOSIS — M79.671 BILATERAL FOOT PAIN: ICD-10-CM

## 2024-09-20 DIAGNOSIS — F90.0 ATTENTION DEFICIT HYPERACTIVITY DISORDER (ADHD), PREDOMINANTLY INATTENTIVE TYPE: ICD-10-CM

## 2024-09-20 DIAGNOSIS — M79.672 BILATERAL FOOT PAIN: ICD-10-CM

## 2024-09-20 PROCEDURE — RXOTC WILLOW AMBULATORY OTC CHARGE

## 2024-09-20 PROCEDURE — RXMED WILLOW AMBULATORY MEDICATION CHARGE

## 2024-09-20 RX ORDER — DEXMETHYLPHENIDATE HYDROCHLORIDE 10 MG/1
10 TABLET ORAL 2 TIMES DAILY
Qty: 60 TABLET | Refills: 0 | Status: SHIPPED | OUTPATIENT
Start: 2024-09-20 | End: 2025-03-23

## 2024-09-26 ENCOUNTER — HOSPITAL ENCOUNTER (OUTPATIENT)
Dept: RADIOLOGY | Facility: CLINIC | Age: 24
Discharge: HOME | End: 2024-09-26
Payer: COMMERCIAL

## 2024-09-26 ENCOUNTER — LAB (OUTPATIENT)
Dept: LAB | Facility: LAB | Age: 24
End: 2024-09-26
Payer: COMMERCIAL

## 2024-09-26 ENCOUNTER — APPOINTMENT (OUTPATIENT)
Dept: ORTHOPEDIC SURGERY | Facility: CLINIC | Age: 24
End: 2024-09-26
Payer: COMMERCIAL

## 2024-09-26 VITALS — WEIGHT: 225 LBS | BODY MASS INDEX: 30.48 KG/M2 | HEIGHT: 72 IN

## 2024-09-26 DIAGNOSIS — M67.01 ACHILLES TENDON CONTRACTURE, BILATERAL: ICD-10-CM

## 2024-09-26 DIAGNOSIS — E55.9 VITAMIN D DEFICIENCY: ICD-10-CM

## 2024-09-26 DIAGNOSIS — M67.02 ACHILLES TENDON CONTRACTURE, BILATERAL: ICD-10-CM

## 2024-09-26 DIAGNOSIS — M21.42 PES PLANUS OF BOTH FEET: Primary | ICD-10-CM

## 2024-09-26 DIAGNOSIS — M79.671 BILATERAL FOOT PAIN: ICD-10-CM

## 2024-09-26 DIAGNOSIS — M21.41 PES PLANUS OF BOTH FEET: Primary | ICD-10-CM

## 2024-09-26 DIAGNOSIS — K76.0 FATTY LIVER: ICD-10-CM

## 2024-09-26 DIAGNOSIS — R74.8 ELEVATED LIVER ENZYMES: ICD-10-CM

## 2024-09-26 DIAGNOSIS — M21.42 BILATERAL PES PLANUS: ICD-10-CM

## 2024-09-26 DIAGNOSIS — M79.672 BILATERAL FOOT PAIN: ICD-10-CM

## 2024-09-26 DIAGNOSIS — E78.2 MIXED HYPERLIPIDEMIA: ICD-10-CM

## 2024-09-26 DIAGNOSIS — Z79.899 MEDICATION MANAGEMENT: ICD-10-CM

## 2024-09-26 DIAGNOSIS — M25.572 SINUS TARSI SYNDROME OF LEFT FOOT: ICD-10-CM

## 2024-09-26 DIAGNOSIS — F90.0 ATTENTION DEFICIT HYPERACTIVITY DISORDER (ADHD), PREDOMINANTLY INATTENTIVE TYPE: ICD-10-CM

## 2024-09-26 DIAGNOSIS — M21.41 BILATERAL PES PLANUS: ICD-10-CM

## 2024-09-26 LAB
25(OH)D3 SERPL-MCNC: 22 NG/ML (ref 30–100)
ALBUMIN SERPL BCP-MCNC: 4.5 G/DL (ref 3.4–5)
ALP SERPL-CCNC: 64 U/L (ref 33–120)
ALT SERPL W P-5'-P-CCNC: 96 U/L (ref 10–52)
ANION GAP SERPL CALC-SCNC: 12 MMOL/L (ref 10–20)
AST SERPL W P-5'-P-CCNC: 51 U/L (ref 9–39)
BASOPHILS # BLD AUTO: 0.04 X10*3/UL (ref 0–0.1)
BASOPHILS NFR BLD AUTO: 0.6 %
BILIRUB DIRECT SERPL-MCNC: 0.2 MG/DL (ref 0–0.3)
BILIRUB SERPL-MCNC: 1.3 MG/DL (ref 0–1.2)
BUN SERPL-MCNC: 19 MG/DL (ref 6–23)
CALCIUM SERPL-MCNC: 9.2 MG/DL (ref 8.6–10.3)
CHLORIDE SERPL-SCNC: 102 MMOL/L (ref 98–107)
CHOLEST SERPL-MCNC: 158 MG/DL (ref 0–199)
CHOLESTEROL/HDL RATIO: 4.8
CO2 SERPL-SCNC: 29 MMOL/L (ref 21–32)
CREAT SERPL-MCNC: 0.93 MG/DL (ref 0.5–1.3)
EGFRCR SERPLBLD CKD-EPI 2021: >90 ML/MIN/1.73M*2
EOSINOPHIL # BLD AUTO: 0.16 X10*3/UL (ref 0–0.7)
EOSINOPHIL NFR BLD AUTO: 2.5 %
ERYTHROCYTE [DISTWIDTH] IN BLOOD BY AUTOMATED COUNT: 13.4 % (ref 11.5–14.5)
GLUCOSE SERPL-MCNC: 100 MG/DL (ref 74–99)
HCT VFR BLD AUTO: 47.9 % (ref 41–52)
HDLC SERPL-MCNC: 33.2 MG/DL
HGB BLD-MCNC: 15.8 G/DL (ref 13.5–17.5)
IMM GRANULOCYTES # BLD AUTO: 0.02 X10*3/UL (ref 0–0.7)
IMM GRANULOCYTES NFR BLD AUTO: 0.3 % (ref 0–0.9)
LDLC SERPL CALC-MCNC: 85 MG/DL
LYMPHOCYTES # BLD AUTO: 1.78 X10*3/UL (ref 1.2–4.8)
LYMPHOCYTES NFR BLD AUTO: 28 %
MCH RBC QN AUTO: 27.6 PG (ref 26–34)
MCHC RBC AUTO-ENTMCNC: 33 G/DL (ref 32–36)
MCV RBC AUTO: 84 FL (ref 80–100)
MONOCYTES # BLD AUTO: 0.64 X10*3/UL (ref 0.1–1)
MONOCYTES NFR BLD AUTO: 10.1 %
NEUTROPHILS # BLD AUTO: 3.72 X10*3/UL (ref 1.2–7.7)
NEUTROPHILS NFR BLD AUTO: 58.5 %
NON HDL CHOLESTEROL: 125 MG/DL (ref 0–149)
NRBC BLD-RTO: 0 /100 WBCS (ref 0–0)
PLATELET # BLD AUTO: 319 X10*3/UL (ref 150–450)
POTASSIUM SERPL-SCNC: 4.3 MMOL/L (ref 3.5–5.3)
PROT SERPL-MCNC: 7.4 G/DL (ref 6.4–8.2)
RBC # BLD AUTO: 5.72 X10*6/UL (ref 4.5–5.9)
SODIUM SERPL-SCNC: 139 MMOL/L (ref 136–145)
TRIGL SERPL-MCNC: 198 MG/DL (ref 0–149)
TSH SERPL-ACNC: 3.22 MIU/L (ref 0.44–3.98)
VLDL: 40 MG/DL (ref 0–40)
WBC # BLD AUTO: 6.4 X10*3/UL (ref 4.4–11.3)

## 2024-09-26 PROCEDURE — 99204 OFFICE O/P NEW MOD 45 MIN: CPT | Performed by: SPECIALIST

## 2024-09-26 PROCEDURE — 85025 COMPLETE CBC W/AUTO DIFF WBC: CPT

## 2024-09-26 PROCEDURE — 1036F TOBACCO NON-USER: CPT | Performed by: SPECIALIST

## 2024-09-26 PROCEDURE — 84443 ASSAY THYROID STIM HORMONE: CPT

## 2024-09-26 PROCEDURE — 80053 COMPREHEN METABOLIC PANEL: CPT

## 2024-09-26 PROCEDURE — 73630 X-RAY EXAM OF FOOT: CPT | Mod: 50

## 2024-09-26 PROCEDURE — 82248 BILIRUBIN DIRECT: CPT

## 2024-09-26 PROCEDURE — 3008F BODY MASS INDEX DOCD: CPT | Performed by: SPECIALIST

## 2024-09-26 PROCEDURE — 36415 COLL VENOUS BLD VENIPUNCTURE: CPT

## 2024-09-26 PROCEDURE — 82306 VITAMIN D 25 HYDROXY: CPT

## 2024-09-26 PROCEDURE — 80061 LIPID PANEL: CPT

## 2024-09-26 ASSESSMENT — PAIN - FUNCTIONAL ASSESSMENT: PAIN_FUNCTIONAL_ASSESSMENT: NO/DENIES PAIN

## 2024-09-26 NOTE — PROGRESS NOTES
Hector Cruz is a new patient coming in with bilateral foot pain for 1 month, left worse than right. Localized pain near the lateral sarah nterior ankle. No Hx of trauma, Sx or injections to the area. Denies numbness/tingling. Advil as needed. No diabetes. XR done today.  Mom and sister have flatfeet, and she states sodas Hector.    Exam: Alert and oriented x 3 no acute distress.  In stance he has bilateral pes planus slightly more abduction on the right.  He is able to heel rise with good strength and hindfoot inversion.  Lower extremity neurovascular status intact.  Holding both feet in neutral has severely tight Achilles bilateral.  Motors otherwise 5 out of 5 he has supple painless subtalar motion bilateral.  Does have pain to palpation in the region of the left sinus Tarsi.    Radiographs: Bilateral pes planus no obvious tarsal coalitions.    Assessment plan: Bilateral flexible pes planus with left sinus Tarsi syndrome.  This should resolve with medial posting foot orthotics and a course of PT emphasizing Achilles stretching and tibialis strengthening.  Follow-up if no improvement.

## 2024-10-11 ENCOUNTER — EVALUATION (OUTPATIENT)
Dept: PHYSICAL THERAPY | Facility: HOSPITAL | Age: 24
End: 2024-10-11
Payer: COMMERCIAL

## 2024-10-11 DIAGNOSIS — R26.9 GAIT ABNORMALITY: Primary | ICD-10-CM

## 2024-10-11 DIAGNOSIS — M67.01 ACHILLES TENDON CONTRACTURE, BILATERAL: ICD-10-CM

## 2024-10-11 DIAGNOSIS — R29.898 ANKLE WEAKNESS: ICD-10-CM

## 2024-10-11 DIAGNOSIS — M21.42 BILATERAL PES PLANUS: ICD-10-CM

## 2024-10-11 DIAGNOSIS — M67.02 ACHILLES TENDON CONTRACTURE, BILATERAL: ICD-10-CM

## 2024-10-11 DIAGNOSIS — M21.41 BILATERAL PES PLANUS: ICD-10-CM

## 2024-10-11 PROCEDURE — 97161 PT EVAL LOW COMPLEX 20 MIN: CPT | Mod: GP | Performed by: PHYSICAL THERAPIST

## 2024-10-11 PROCEDURE — 97110 THERAPEUTIC EXERCISES: CPT | Mod: GP | Performed by: PHYSICAL THERAPIST

## 2024-10-11 ASSESSMENT — ENCOUNTER SYMPTOMS
DEPRESSION: 0
OCCASIONAL FEELINGS OF UNSTEADINESS: 0
LOSS OF SENSATION IN FEET: 0

## 2024-10-11 ASSESSMENT — PATIENT HEALTH QUESTIONNAIRE - PHQ9
SUM OF ALL RESPONSES TO PHQ9 QUESTIONS 1 AND 2: 0
2. FEELING DOWN, DEPRESSED OR HOPELESS: NOT AT ALL
1. LITTLE INTEREST OR PLEASURE IN DOING THINGS: NOT AT ALL

## 2024-10-11 ASSESSMENT — PAIN SCALES - GENERAL: PAINLEVEL_OUTOF10: 0 - NO PAIN

## 2024-10-11 ASSESSMENT — PAIN - FUNCTIONAL ASSESSMENT: PAIN_FUNCTIONAL_ASSESSMENT: 0-10

## 2024-10-11 NOTE — PROGRESS NOTES
Physical Therapy    Physical Therapy Evaluation    Patient Name: Hector Cruz  MRN: [id number]  : 2000  Referring Physician: Juanjose Glass  Today's Date: 10/11/2024  Time Calculation  Start Time: 0700  Stop Time: 0740  Time Calculation (min): 40 min  PT Evaluation Time Entry  PT Evaluation (Low) Time Entry: 30  PT Therapeutic Procedures Time Entry  Therapeutic Exercise Time Entry: 10           General  General Comment: Visit #1    Current Problem  Problem List Items Addressed This Visit             ICD-10-CM    Gait abnormality - Primary R26.9    Relevant Orders    Follow Up In Physical Therapy    Ankle weakness R29.898    Relevant Orders    Follow Up In Physical Therapy     Other Visit Diagnoses         Codes    Bilateral pes planus     M21.41, M21.42    Relevant Orders    Follow Up In Physical Therapy    Achilles tendon contracture, bilateral     M67.01, M67.02    Relevant Orders    Follow Up In Physical Therapy               SUBJECTIVE  Subjective   Patient reports left > right foot pain and lateral ankle pain that began insidiously approximately the end of August . This is leading to difficulty with standing and walking especially toward the end of his work shift.  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  Patient states that their goal is to decrease pain with physical therapy intervention.    Prior level of function: No functional limits.    Patient's name and  were confirmed this date.        Precautions  Precautions  STEADI Fall Risk Score (The score of 4 or more indicates an increased risk of falling): 0  Precautions Comment: none       Pain  Pain Assessment: 0-10  0-10 (Numeric) Pain Score: 0 - No pain        OBJECTIVE:    Lower Extremity Strength:  LE strength not listed below is WNL  MMT 5/5 max  LEFT RIGHT   Hip Flexion  4+/5  4+/5                   Knee Extension     Knee Flexion     Ankle DF  4/5  4+/5    Ankle PF  4+/5  4+/5   Ankle INV  4/5  4+/5   Ankle EV  4+/5  4+/5     Lower Extremity ROM:   LE ROM not listed below is WNL      Ankle: Pt. AROM and PROM Is limited to 0* DF albert ankle and 0* left ankle inversion . The remainder of albert ankle AROM is wNL        Joint mobility Decreased left talocrural joint posterior glice    Gait mechanics: Pes planus albert    Palpation tenderness to palpation lateral left ankle        Other Measures  Lower Extremity Funtional Score (LEFS): 53       TREATMENT:    EXERCISES Date 10/11/24 Date Date Date   VISIT # # 1 # # #   HEP  10'             Nustep       Bike              Shuttle  DLP       Shuttle SLP       Shuttle TR/HR              Qhip Flexion       Qhip Extension       Qhip Abduction       Qhip  TKE              Mini squat       Mini lunge       3 way LE raise                     Manual                                                                                 PATIENT EDUCATION:  Pt. Provided with HEP including albert DF stretching and ankle alphabet to complete three times per day    ASSESSEMENT  Symptoms indicate albert foot pain d/t pes planus, decreased left ankle strength and decreased ROM.  Pt. Will likely benefit from custom made foot orthotics    Rehab potential: Excellent    PLAN  Treatment/Interventions: Manual therapy, Therapeutic activities, Therapeutic exercises, Gait training  PT Plan: Skilled PT  PT Frequency: 2 times per week  Duration: 8 weeks    Pt. Is in agreement with PT plan.  Goals:  Active       PT Problem       PT Goal 1       Start:  10/11/24    Expected End:  12/10/24       Short tem goals to be achieve within 4 weeks:    1. Pt's albert ankle DF  PROM will improve to  10* to allow for improved gait    Long term goals to be achieved within 8 weeks:    1. Pt's left ankle strength will improve to  4+/5 throughout to allow for improved gait safety  2. Pt's albert ankle DF PROM will improve to  12-15* to allow for improved gait safety  3.   Consider custom made  foot orthotics  4.   Pt's stated goal is to decrease pain

## 2024-10-16 ENCOUNTER — TREATMENT (OUTPATIENT)
Dept: PHYSICAL THERAPY | Facility: HOSPITAL | Age: 24
End: 2024-10-16
Payer: COMMERCIAL

## 2024-10-16 DIAGNOSIS — R29.898 ANKLE WEAKNESS: ICD-10-CM

## 2024-10-16 DIAGNOSIS — M21.41 BILATERAL PES PLANUS: ICD-10-CM

## 2024-10-16 DIAGNOSIS — M67.01 ACHILLES TENDON CONTRACTURE, BILATERAL: ICD-10-CM

## 2024-10-16 DIAGNOSIS — M21.42 BILATERAL PES PLANUS: ICD-10-CM

## 2024-10-16 DIAGNOSIS — M67.02 ACHILLES TENDON CONTRACTURE, BILATERAL: ICD-10-CM

## 2024-10-16 DIAGNOSIS — R26.9 GAIT ABNORMALITY: ICD-10-CM

## 2024-10-16 PROCEDURE — 97110 THERAPEUTIC EXERCISES: CPT | Mod: GP,CQ | Performed by: PHYSICAL THERAPY ASSISTANT

## 2024-10-16 PROCEDURE — 97140 MANUAL THERAPY 1/> REGIONS: CPT | Mod: GP,CQ | Performed by: PHYSICAL THERAPY ASSISTANT

## 2024-10-16 ASSESSMENT — PAIN SCALES - GENERAL: PAINLEVEL_OUTOF10: 0 - NO PAIN

## 2024-10-16 ASSESSMENT — PAIN - FUNCTIONAL ASSESSMENT: PAIN_FUNCTIONAL_ASSESSMENT: 0-10

## 2024-10-16 NOTE — PROGRESS NOTES
"Physical Therapy    Physical Therapy Treatment    Patient Name: Hector Cruz  MRN: [id number]  : 2000  Today's Date: 10/16/2024  Time Calculation  Start Time: 0755  Stop Time: 0840  Time Calculation (min): 45 min    PT Therapeutic Procedures Time Entry  Manual Therapy Time Entry: 10  Therapeutic Exercise Time Entry: 35          VISIT:# 2    Current Problem  1. Bilateral pes planus  Follow Up In Physical Therapy      2. Achilles tendon contracture, bilateral  Follow Up In Physical Therapy      3. Gait abnormality  Follow Up In Physical Therapy      4. Ankle weakness  Follow Up In Physical Therapy          Subjective Hector reported he has been doing HEP and it seems to be beneficial.    Precautions  Precautions  STEADI Fall Risk Score (The score of 4 or more indicates an increased risk of falling): 0  Precautions Comment: none       Pain  Pain Assessment: 0-10  0-10 (Numeric) Pain Score: 0 - No pain       Objective initiated exercises as per flow sheet.            Treatments:  EXERCISES Date 10/11/24 Date 10/16/24 Date Date   VISIT # # 1 #2 # #   HEP  10'             Nustep  L3 10\"     Bike              Shuttle  DLP  6B 2x15     Shuttle SLP  4B 2x15     Shuttle TR/HR              Qhip Flexion       Qhip Extension       Qhip Abduction       Qhip  TKE              Mini squat  2x15     Mini lunge  2x15     3 way LE raise              Rocker board stretch   HS stretch  4x30\"H  4x30\"H seated     Manual  10'                                                                                 Assessment:  PT Assessment  Assessment Comment: pt with good tolerance of exercises this session. Education was provided throughout session for technique. pt with decreased pain and tightness at end of session, 1/10.       Plan:  OP PT Plan  Treatment/Interventions: Manual therapy, Therapeutic activities, Therapeutic exercises, Gait training  PT Plan: Skilled PT  PT Frequency: 2 times per week  Duration: 8 weeks    Goals:  Active  "      PT Problem       PT Goal 1       Start:  10/11/24    Expected End:  12/10/24       Short tem goals to be achieve within 4 weeks:    1. Pt's albert ankle DF  PROM will improve to  10* to allow for improved gait    Long term goals to be achieved within 8 weeks:    1. Pt's left ankle strength will improve to  4+/5 throughout to allow for improved gait safety  2. Pt's albert ankle DF PROM will improve to  12-15* to allow for improved gait safety  3.   Consider custom made foot orthotics  4.   Pt's stated goal is to decrease pain

## 2024-10-18 ENCOUNTER — TREATMENT (OUTPATIENT)
Dept: PHYSICAL THERAPY | Facility: HOSPITAL | Age: 24
End: 2024-10-18
Payer: COMMERCIAL

## 2024-10-18 DIAGNOSIS — R26.9 GAIT ABNORMALITY: ICD-10-CM

## 2024-10-18 DIAGNOSIS — M67.01 ACHILLES TENDON CONTRACTURE, BILATERAL: ICD-10-CM

## 2024-10-18 DIAGNOSIS — M67.02 ACHILLES TENDON CONTRACTURE, BILATERAL: ICD-10-CM

## 2024-10-18 DIAGNOSIS — M21.41 BILATERAL PES PLANUS: ICD-10-CM

## 2024-10-18 DIAGNOSIS — M21.42 BILATERAL PES PLANUS: ICD-10-CM

## 2024-10-18 DIAGNOSIS — R29.898 ANKLE WEAKNESS: ICD-10-CM

## 2024-10-18 PROCEDURE — 97110 THERAPEUTIC EXERCISES: CPT | Mod: GP | Performed by: PHYSICAL THERAPIST

## 2024-10-18 PROCEDURE — 97140 MANUAL THERAPY 1/> REGIONS: CPT | Mod: GP | Performed by: PHYSICAL THERAPIST

## 2024-10-18 ASSESSMENT — PAIN - FUNCTIONAL ASSESSMENT: PAIN_FUNCTIONAL_ASSESSMENT: 0-10

## 2024-10-18 ASSESSMENT — PAIN SCALES - GENERAL: PAINLEVEL_OUTOF10: 0 - NO PAIN

## 2024-10-18 NOTE — PROGRESS NOTES
"Physical Therapy    Physical Therapy Treatment    Patient Name: Hector Cruz  MRN: [id number]  : 2000   Juanjose Glass  Today's Date: 10/18/2024  Time Calculation  Start Time: 1130  Stop Time: 1200  Time Calculation (min): 30 min  PT Therapeutic Procedures Time Entry  Manual Therapy Time Entry: 10  Therapeutic Exercise Time Entry: 20           General     Visit #3     Current Problem  Problem List Items Addressed This Visit             ICD-10-CM    Gait abnormality R26.9    Ankle weakness R29.898     Other Visit Diagnoses         Codes    Bilateral pes planus     M21.41, M21.42    Achilles tendon contracture, bilateral     M67.01, M67.02                 Subjective   0/10 pain currently   Pt.'s name and  were confirmed this date.    Pt. Reports compliance with HEP.    Precautions  Precautions  STEADI Fall Risk Score (The score of 4 or more indicates an increased risk of falling): 0  Precautions Comment: none    Pain  Pain Assessment: 0-10  0-10 (Numeric) Pain Score: 0 - No pain    Objective      Full DF PROM right ankle, 0 DF PROM left ankle    Pt's name and  were confirmed today.        Treatments:    EXERCISES Date 10/11/24 Date 10/16/24 Date10/18/24 Date   VISIT # # 1 #2 #3 #   HEP  10'             Nustep  L3 10\"  L3 10\"    Bike              Shuttle  DLP  6B 2x15  6B 2x15    Shuttle SLP  4B 2x15  4B 2x15    Shuttle TR/HR              Qhip Flexion       Qhip Extension       Qhip Abduction       Qhip  TKE              Mini squat  2x15  2x15    Mini lunge  2x15  2x15    3 way LE raise              Rocker board stretch   HS stretch  4x30\"H  4x30\"H seated 4x30\"H  4x30\"H seated    Manual  10'  Man ankle DF and talorural posterior glide x 10 min                                                                              Patient Education:    Assessment:       Plan:  OP PT Plan  Treatment/Interventions: Manual therapy, Therapeutic activities, Therapeutic exercises, Gait training  PT Plan: Skilled " PT  PT Frequency: 2 times per week  Duration: 8 weeks    Goals:  Active       PT Problem       PT Goal 1       Start:  10/11/24    Expected End:  12/10/24       Short tem goals to be achieve within 4 weeks:    1. Pt's albert ankle DF  PROM will improve to  10* to allow for improved gait    Long term goals to be achieved within 8 weeks:    1. Pt's left ankle strength will improve to  4+/5 throughout to allow for improved gait safety  2. Pt's albert ankle DF PROM will improve to  12-15* to allow for improved gait safety  3.   Consider custom made foot orthotics  4.   Pt's stated goal is to decrease pain

## 2024-10-23 ENCOUNTER — TREATMENT (OUTPATIENT)
Dept: PHYSICAL THERAPY | Facility: HOSPITAL | Age: 24
End: 2024-10-23
Payer: COMMERCIAL

## 2024-10-23 DIAGNOSIS — M67.02 ACHILLES TENDON CONTRACTURE, BILATERAL: ICD-10-CM

## 2024-10-23 DIAGNOSIS — M67.01 ACHILLES TENDON CONTRACTURE, BILATERAL: ICD-10-CM

## 2024-10-23 DIAGNOSIS — R26.9 GAIT ABNORMALITY: ICD-10-CM

## 2024-10-23 DIAGNOSIS — M21.41 BILATERAL PES PLANUS: ICD-10-CM

## 2024-10-23 DIAGNOSIS — M21.42 BILATERAL PES PLANUS: ICD-10-CM

## 2024-10-23 DIAGNOSIS — R29.898 ANKLE WEAKNESS: ICD-10-CM

## 2024-10-23 PROCEDURE — 97110 THERAPEUTIC EXERCISES: CPT | Mod: GP,CQ

## 2024-10-23 ASSESSMENT — PAIN - FUNCTIONAL ASSESSMENT: PAIN_FUNCTIONAL_ASSESSMENT: 0-10

## 2024-10-23 ASSESSMENT — PAIN SCALES - GENERAL: PAINLEVEL_OUTOF10: 0 - NO PAIN

## 2024-10-23 NOTE — PROGRESS NOTES
"Physical Therapy    Physical Therapy Treatment    Patient Name: Hector Cruz  MRN: [id number]  : 2000  Today's Date: 10/23/2024  Time Calculation  Start Time: 705  Stop Time: 745  Time Calculation (min): 40 min    PT Therapeutic Procedures Time Entry  Therapeutic Exercise Time Entry: 40          VISIT:# 4    Current Problem  Problem List Items Addressed This Visit             ICD-10-CM    Gait abnormality R26.9    Ankle weakness R29.898     Other Visit Diagnoses         Codes    Bilateral pes planus     M21.41, M21.42    Achilles tendon contracture, bilateral     M67.01, M67.02             Subjective   No falls reported since last visit. Patient is independent with current HEP.       Precautions  Precautions  STEADI Fall Risk Score (The score of 4 or more indicates an increased risk of falling): 0  Precautions Comment: none       Pain  Pain Assessment: 0-10  0-10 (Numeric) Pain Score: 0 - No pain       Objective    Advanced exercises            Treatments:  EXERCISES Date 10/11/24 Date 10/16/24 Date10/18/24 Date 10/23/2024    VISIT # # 1 #2 #3 #4   HEP  10'             Nustep  L3 10\"  L3 10\" 10' @L3   Bike              Shuttle  DLP  6B 2x15  6B 2x15 6b 2 x 15    Shuttle SLP  4B 2x15  4B 2x15 5b 2 x 15    Shuttle TR/HR    4b 3\" 10 x 2          Qhip Flexion    next   Qhip Extension    next   Qhip Abduction    next                 Mini squat  2x15  2x15 2 x 10    Mini lunge  2x15  2x15 2 laps // bars   3 way LE raise    2 x 10  easy           Rocker board stretch   HS stretch  4x30\"H  4x30\"H seated 4x30\"H  4x30\"H seated 4 x 30\"  4x 30\" seated   Manual  10'  Man ankle DF and talorural posterior glide x 10 min        DF step stretch 20\" x 2                   Assessment:   Pt had no pain with today's exercises.        Plan: add qhip next visit   OP PT Plan  Treatment/Interventions: Manual therapy, Therapeutic activities, Therapeutic exercises, Gait training  PT Plan: Skilled PT  PT Frequency: 2 times per " week  Duration: 8 weeks    Goals:  Active       PT Problem       PT Goal 1 (Progressing)       Start:  10/11/24    Expected End:  12/10/24       Short tem goals to be achieve within 4 weeks:    1. Pt's albert ankle DF  PROM will improve to  10* to allow for improved gait    Long term goals to be achieved within 8 weeks:    1. Pt's left ankle strength will improve to  4+/5 throughout to allow for improved gait safety  2. Pt's albert ankle DF PROM will improve to  12-15* to allow for improved gait safety  3.   Consider custom made foot orthotics  4.   Pt's stated goal is to decrease pain

## 2024-10-25 ENCOUNTER — TREATMENT (OUTPATIENT)
Dept: PHYSICAL THERAPY | Facility: HOSPITAL | Age: 24
End: 2024-10-25
Payer: COMMERCIAL

## 2024-10-25 DIAGNOSIS — M67.02 ACHILLES TENDON CONTRACTURE, BILATERAL: ICD-10-CM

## 2024-10-25 DIAGNOSIS — M67.01 ACHILLES TENDON CONTRACTURE, BILATERAL: ICD-10-CM

## 2024-10-25 DIAGNOSIS — M21.42 BILATERAL PES PLANUS: ICD-10-CM

## 2024-10-25 DIAGNOSIS — R26.9 GAIT ABNORMALITY: ICD-10-CM

## 2024-10-25 DIAGNOSIS — M21.41 BILATERAL PES PLANUS: ICD-10-CM

## 2024-10-25 DIAGNOSIS — R29.898 ANKLE WEAKNESS: ICD-10-CM

## 2024-10-25 PROCEDURE — 97110 THERAPEUTIC EXERCISES: CPT | Mod: GP,CQ

## 2024-10-25 ASSESSMENT — PAIN SCALES - GENERAL: PAINLEVEL_OUTOF10: 0 - NO PAIN

## 2024-10-25 ASSESSMENT — PAIN - FUNCTIONAL ASSESSMENT: PAIN_FUNCTIONAL_ASSESSMENT: 0-10

## 2024-10-25 NOTE — PROGRESS NOTES
"Physical Therapy    Physical Therapy Treatment    Patient Name: Hector Cruz  MRN: [id number]  : 2000  Today's Date: 10/25/2024  Time Calculation  Start Time: 830  Stop Time: 910  Time Calculation (min): 40 min    PT Therapeutic Procedures Time Entry  Therapeutic Exercise Time Entry: 40          VISIT:# 5    Current Problem  Problem List Items Addressed This Visit             ICD-10-CM    Gait abnormality R26.9    Ankle weakness R29.898     Other Visit Diagnoses         Codes    Bilateral pes planus     M21.41, M21.42    Achilles tendon contracture, bilateral     M67.01, M67.02             Subjective   No falls reported since last visit. Patient is independent with current HEP.       Precautions  Precautions  STEADI Fall Risk Score (The score of 4 or more indicates an increased risk of falling): 0  Precautions Comment: none       Pain  Pain Assessment: 0-10  0-10 (Numeric) Pain Score: 0 - No pain       Objective    Added Qhip   Pt reports pain is mostly after work usually about a 2/10   Access Code: [email or web address]  URL: [email or web address]  Date: 10/25/2024  Prepared by: Darío Bonilla    Exercises  - Standing Hip Abduction with Counter Support  - 1-2 x daily - 5-7 x weekly - 3 sets - 10 reps  - Standing March with Counter Support  - 1-2 x daily - 5-7 x weekly - 3 sets - 10 reps  - Standing Hip Extension with Counter Support  - 1-2 x daily - 5-7 x weekly - 3 sets - 10 reps     Treatments:  EXERCISES Date10/18/24 Date 10/23/2024  10/25/2024    VISIT # #3 #4 5   HEP            Nustep  L3 10\" 10' @L3 10' @L4   Bike            Shuttle  DLP  6B 2x15 6b 2 x 15  6b 2 x 20    Shuttle SLP  4B 2x15 5b 2 x 15  5b 2 x 20    Shuttle TR/HR  4b 3\" 10 x 2          Qhip Flexion  next 2 x 10 10#   Qhip Extension  next 2 x 10 10#   Qhip Abduction  next 2 x 10 10#               Mini squat  2x15 2 x 10  20x   Mini lunge  2x15 2 laps // bars 2 laps //bars   3 way LE raise  2 x 10  easy  home       " "  Rocker board stretch   HS stretch 4x30\"H  4x30\"H seated 4 x 30\"  4x 30\" seated 30\" x 3  30\" x 3 eac   Manual  Man ankle DF and talorural posterior glide x 10 min       DF step stretch 20\" x 2          HEP   Access Code: [email or web address]       Assessment:  Pt understands new Hep and has demo'd them in the past few sessions.      Outpatient Education  Individual(s) Educated: Patient  Education Provided: Home Exercise Program  Risk and Benefits Discussed with Patient/Caregiver/Other: yes  Patient/Caregiver Demonstrated Understanding: yes  Plan of Care Discussed and Agreed Upon: yes  Patient Response to Education: Patient/Caregiver Verbalized Understanding of Information, Patient/Caregiver Performed Return Demonstration of Exercises/Activities, Patient/Caregiver Asked Appropriate Questions  Education Comment: Access Code: [email or web address]    Plan: Check DF ROM next session   OP PT Plan  Treatment/Interventions: Manual therapy, Therapeutic activities, Therapeutic exercises, Gait training  PT Plan: Skilled PT  PT Frequency: 2 times per week  Duration: 8 weeks    Goals:  Active       PT Problem       PT Goal 1 (Progressing)       Start:  10/11/24    Expected End:  12/10/24       Short tem goals to be achieve within 4 weeks:    1. Pt's man ankle DF  PROM will improve to  10* to allow for improved gait    Long term goals to be achieved within 8 weeks:    1. Pt's left ankle strength will improve to  4+/5 throughout to allow for improved gait safety  2. Pt's man ankle DF PROM will improve to  12-15* to allow for improved gait safety  3.   Consider custom made foot orthotics  4.   Pt's stated goal is to decrease pain        Goal Note       #4 progressing                  "

## 2024-10-29 ENCOUNTER — TREATMENT (OUTPATIENT)
Dept: PHYSICAL THERAPY | Facility: HOSPITAL | Age: 24
End: 2024-10-29
Payer: COMMERCIAL

## 2024-10-29 DIAGNOSIS — R26.9 GAIT ABNORMALITY: ICD-10-CM

## 2024-10-29 DIAGNOSIS — M21.41 BILATERAL PES PLANUS: ICD-10-CM

## 2024-10-29 DIAGNOSIS — M67.01 ACHILLES TENDON CONTRACTURE, BILATERAL: ICD-10-CM

## 2024-10-29 DIAGNOSIS — M21.42 BILATERAL PES PLANUS: ICD-10-CM

## 2024-10-29 DIAGNOSIS — R29.898 ANKLE WEAKNESS: ICD-10-CM

## 2024-10-29 DIAGNOSIS — M67.02 ACHILLES TENDON CONTRACTURE, BILATERAL: ICD-10-CM

## 2024-10-29 PROCEDURE — 97110 THERAPEUTIC EXERCISES: CPT | Mod: GP | Performed by: PHYSICAL THERAPIST

## 2024-10-29 ASSESSMENT — PAIN SCALES - GENERAL: PAINLEVEL_OUTOF10: 0 - NO PAIN

## 2024-10-29 ASSESSMENT — PAIN - FUNCTIONAL ASSESSMENT: PAIN_FUNCTIONAL_ASSESSMENT: 0-10

## 2024-10-30 DIAGNOSIS — F90.0 ATTENTION DEFICIT HYPERACTIVITY DISORDER (ADHD), PREDOMINANTLY INATTENTIVE TYPE: ICD-10-CM

## 2024-10-30 RX ORDER — DEXMETHYLPHENIDATE HYDROCHLORIDE 10 MG/1
10 TABLET ORAL 2 TIMES DAILY
Qty: 60 TABLET | Refills: 0 | Status: SHIPPED | OUTPATIENT
Start: 2024-10-30 | End: 2025-05-02

## 2024-10-31 ENCOUNTER — APPOINTMENT (OUTPATIENT)
Dept: GASTROENTEROLOGY | Facility: CLINIC | Age: 24
End: 2024-10-31
Payer: COMMERCIAL

## 2024-10-31 ENCOUNTER — PHARMACY VISIT (OUTPATIENT)
Dept: PHARMACY | Facility: CLINIC | Age: 24
End: 2024-10-31
Payer: COMMERCIAL

## 2024-10-31 VITALS
SYSTOLIC BLOOD PRESSURE: 132 MMHG | OXYGEN SATURATION: 98 % | DIASTOLIC BLOOD PRESSURE: 70 MMHG | HEIGHT: 72 IN | WEIGHT: 228 LBS | HEART RATE: 91 BPM | BODY MASS INDEX: 30.88 KG/M2

## 2024-10-31 DIAGNOSIS — K76.0 FATTY LIVER: ICD-10-CM

## 2024-10-31 DIAGNOSIS — R74.8 ELEVATED LIVER ENZYMES: ICD-10-CM

## 2024-10-31 PROCEDURE — 3008F BODY MASS INDEX DOCD: CPT | Performed by: INTERNAL MEDICINE

## 2024-10-31 PROCEDURE — RXMED WILLOW AMBULATORY MEDICATION CHARGE

## 2024-10-31 PROCEDURE — 99214 OFFICE O/P EST MOD 30 MIN: CPT | Performed by: INTERNAL MEDICINE

## 2024-10-31 ASSESSMENT — PAIN SCALES - GENERAL: PAINLEVEL_OUTOF10: 0-NO PAIN

## 2024-11-01 ENCOUNTER — TREATMENT (OUTPATIENT)
Dept: PHYSICAL THERAPY | Facility: HOSPITAL | Age: 24
End: 2024-11-01
Payer: COMMERCIAL

## 2024-11-01 DIAGNOSIS — M21.41 PES PLANUS OF BOTH FEET: ICD-10-CM

## 2024-11-01 DIAGNOSIS — R26.9 GAIT ABNORMALITY: ICD-10-CM

## 2024-11-01 DIAGNOSIS — R29.898 ANKLE WEAKNESS: ICD-10-CM

## 2024-11-01 DIAGNOSIS — M21.42 PES PLANUS OF BOTH FEET: ICD-10-CM

## 2024-11-01 DIAGNOSIS — M21.41 BILATERAL PES PLANUS: ICD-10-CM

## 2024-11-01 DIAGNOSIS — M67.01 ACHILLES TENDON CONTRACTURE, BILATERAL: ICD-10-CM

## 2024-11-01 DIAGNOSIS — M67.02 ACHILLES TENDON CONTRACTURE, BILATERAL: ICD-10-CM

## 2024-11-01 DIAGNOSIS — M21.42 BILATERAL PES PLANUS: ICD-10-CM

## 2024-11-01 PROCEDURE — 97110 THERAPEUTIC EXERCISES: CPT | Mod: GP | Performed by: PHYSICAL THERAPIST

## 2024-11-15 ENCOUNTER — TREATMENT (OUTPATIENT)
Dept: PHYSICAL THERAPY | Facility: HOSPITAL | Age: 24
End: 2024-11-15
Payer: COMMERCIAL

## 2024-11-15 DIAGNOSIS — M67.01 ACHILLES TENDON CONTRACTURE, BILATERAL: ICD-10-CM

## 2024-11-15 DIAGNOSIS — M67.02 ACHILLES TENDON CONTRACTURE, BILATERAL: ICD-10-CM

## 2024-11-15 DIAGNOSIS — M21.41 BILATERAL PES PLANUS: ICD-10-CM

## 2024-11-15 DIAGNOSIS — R29.898 ANKLE WEAKNESS: ICD-10-CM

## 2024-11-15 DIAGNOSIS — R26.9 GAIT ABNORMALITY: ICD-10-CM

## 2024-11-15 DIAGNOSIS — M21.42 BILATERAL PES PLANUS: ICD-10-CM

## 2024-11-15 PROCEDURE — 97164 PT RE-EVAL EST PLAN CARE: CPT | Mod: GP | Performed by: PHYSICAL THERAPIST

## 2024-11-15 ASSESSMENT — PAIN - FUNCTIONAL ASSESSMENT: PAIN_FUNCTIONAL_ASSESSMENT: 0-10

## 2024-11-15 ASSESSMENT — PAIN SCALES - GENERAL: PAINLEVEL_OUTOF10: 0 - NO PAIN

## 2024-11-15 NOTE — PROGRESS NOTES
Physical Therapy    Physical Therapy Treatment    Patient Name: Hector Cruz  MRN: [id number]  : 2000   Juanjose Glass  Today's Date: 11/15/2024  Time Calculation  Start Time: 1100  Stop Time: 1140  Time Calculation (min): 40 min              General     Visit #8     Current Problem  Problem List Items Addressed This Visit             ICD-10-CM    Gait abnormality R26.9    Ankle weakness R29.898     Other Visit Diagnoses         Codes    Bilateral pes planus     M21.41, M21.42    Achilles tendon contracture, bilateral     M67.01, M67.02                 Subjective   Pt. Reports that he has been feeling quite well since he has begun PT.  He has not recently had pain with work activities.  He reports to PT today with a new order to be evaluated and casted for custom made foot orthotics   Pt.'s name and  were confirmed this date.    Pt. Reports compliance with HEP.    Precautions  Precautions  STEADI Fall Risk Score (The score of 4 or more indicates an increased risk of falling): 0  Precautions Comment: none    Pain  Pain Assessment: 0-10  0-10 (Numeric) Pain Score: 0 - No pain    Objective      Pt. Presents with albert forefoot and rearfoot varus deformity fully compensated in weight bearing. Flexible first ray albert.  Significant pes planus albert    Pt's name and  were confirmed today.      Pt. Was evaluated and casted for custom albert foot orthotics albert    Assessment:   Pt requires albert custom foot orthotics with 4 degrees forefoot medial posting and 2 degree rearfoot posting albert    Plan:  OP PT Plan  Treatment/Interventions: Manual therapy, Therapeutic activities, Therapeutic exercises, Gait training  PT Plan: Skilled PT  PT Frequency: 2 times per week  Duration: 8 weeks  Pt will return to PT for orthotic fitting after orthotics are fabricated    Goals:  Active       PT Problem       PT Goal 1 (Progressing)       Start:  10/11/24    Expected End:  12/10/24       Short tem goals to be achieve within 4  weeks:    1. Pt's albert ankle DF  PROM will improve to  10* to allow for improved gait(Achieved)    Long term goals to be achieved within 8 weeks:    1. Pt's left ankle strength will improve to  4+/5 throughout to allow for improved gait safety (Achieved)  2. Pt's albert ankle DF PROM will improve to  12-15* to allow for improved gait safety (Achieved)  3.   Consider custom made foot orthotics  4.   Pt's stated goal is to decrease pain (Achieved 11/1/24)        Goal Note       #4 progressing

## 2024-12-09 DIAGNOSIS — F90.0 ATTENTION DEFICIT HYPERACTIVITY DISORDER (ADHD), PREDOMINANTLY INATTENTIVE TYPE: ICD-10-CM

## 2024-12-09 RX ORDER — DEXMETHYLPHENIDATE HYDROCHLORIDE 10 MG/1
10 TABLET ORAL 2 TIMES DAILY
Qty: 60 TABLET | Refills: 0 | Status: CANCELLED | OUTPATIENT
Start: 2024-12-09 | End: 2025-06-11

## 2024-12-09 RX ORDER — DEXMETHYLPHENIDATE HYDROCHLORIDE 10 MG/1
10 TABLET ORAL 2 TIMES DAILY
Qty: 60 TABLET | Refills: 0 | Status: SHIPPED | OUTPATIENT
Start: 2024-12-09 | End: 2025-06-11

## 2024-12-10 PROCEDURE — RXMED WILLOW AMBULATORY MEDICATION CHARGE

## 2024-12-11 ENCOUNTER — PHARMACY VISIT (OUTPATIENT)
Dept: PHARMACY | Facility: CLINIC | Age: 24
End: 2024-12-11
Payer: COMMERCIAL

## 2024-12-27 ENCOUNTER — APPOINTMENT (OUTPATIENT)
Dept: PHYSICAL THERAPY | Facility: HOSPITAL | Age: 24
End: 2024-12-27
Payer: COMMERCIAL

## 2024-12-27 DIAGNOSIS — R26.9 GAIT ABNORMALITY: ICD-10-CM

## 2024-12-27 DIAGNOSIS — R29.898 ANKLE WEAKNESS: ICD-10-CM

## 2025-01-03 ENCOUNTER — TREATMENT (OUTPATIENT)
Dept: PHYSICAL THERAPY | Facility: HOSPITAL | Age: 25
End: 2025-01-03
Payer: COMMERCIAL

## 2025-01-03 DIAGNOSIS — M67.02 ACHILLES TENDON CONTRACTURE, BILATERAL: ICD-10-CM

## 2025-01-03 DIAGNOSIS — R26.9 GAIT ABNORMALITY: ICD-10-CM

## 2025-01-03 DIAGNOSIS — M21.41 BILATERAL PES PLANUS: ICD-10-CM

## 2025-01-03 DIAGNOSIS — R29.898 ANKLE WEAKNESS: ICD-10-CM

## 2025-01-03 DIAGNOSIS — M67.01 ACHILLES TENDON CONTRACTURE, BILATERAL: ICD-10-CM

## 2025-01-03 DIAGNOSIS — M21.42 BILATERAL PES PLANUS: ICD-10-CM

## 2025-01-03 PROCEDURE — L3002 FOOT INSERT PLASTAZOTE OR EQ: HCPCS | Performed by: PHYSICAL THERAPIST

## 2025-01-03 ASSESSMENT — PAIN SCALES - GENERAL: PAINLEVEL_OUTOF10: 0 - NO PAIN

## 2025-01-03 ASSESSMENT — PAIN - FUNCTIONAL ASSESSMENT: PAIN_FUNCTIONAL_ASSESSMENT: 0-10

## 2025-01-03 NOTE — PROGRESS NOTES
Physical Therapy    Physical Therapy Treatment    Patient Name: Hector Cruz  MRN: [id number]  : 2000   Juanjose Glass  Today's Date: 1/3/2025  Time Calculation  Start Time: 830  Stop Time: 0855  Time Calculation (min): 25 min              General     Visit #9     Current Problem  Problem List Items Addressed This Visit             ICD-10-CM    Gait abnormality R26.9    Ankle weakness R29.898     Other Visit Diagnoses         Codes    Bilateral pes planus     M21.41, M21.42    Achilles tendon contracture, bilateral     M67.01, M67.02                 Subjective   Pt. Verbalizes understanding of orthotic wear schedule   Pt.'s name and  were confirmed this date.    Pt. Reports compliance with HEP.    Precautions  Precautions  STEADI Fall Risk Score (The score of 4 or more indicates an increased risk of falling): 0  Precautions Comment: none    Pain  Pain Assessment: 0-10  0-10 (Numeric) Pain Score: 0 - No pain    Objective      Pt was provided with custom fit orhtotics today  Orthotics were fit to pt shoes    Pt's name and  were confirmed today.        Gait patter improved with orthotic wear    Patient Education:  Pt. Educated in orthotics wear schedule and provided a copy of it today  Assessment:   Excellent fit and control with orthotic wear today    Plan:  Pt. Will progress through orthotic wear schedule and contact this PT with any concerns.  He will be discharged from PT in 4 weeks if no contact from pt with concerns regarding orthotics     Goals:  Active       PT Problem       PT Goal 1 (Progressing)       Start:  10/11/24    Expected End:  12/10/24       Short tem goals to be achieve within 4 weeks:    1. Pt's albert ankle DF  PROM will improve to  10* to allow for improved gait(Achieved)    Long term goals to be achieved within 8 weeks:    1. Pt's left ankle strength will improve to  4+/5 throughout to allow for improved gait safety (Achieved)  2. Pt's albert ankle DF PROM will improve to   12-15* to allow for improved gait safety (Achieved)  3.   Consider custom made foot orthotics  4.   Pt's stated goal is to decrease pain (Achieved 11/1/24)        Goal Note       #4 progressing

## 2025-01-22 DIAGNOSIS — F90.0 ATTENTION DEFICIT HYPERACTIVITY DISORDER (ADHD), PREDOMINANTLY INATTENTIVE TYPE: ICD-10-CM

## 2025-01-22 RX ORDER — DEXMETHYLPHENIDATE HYDROCHLORIDE 10 MG/1
10 TABLET ORAL 2 TIMES DAILY
Qty: 60 TABLET | Refills: 0 | Status: SHIPPED | OUTPATIENT
Start: 2025-01-22 | End: 2025-07-25

## 2025-01-23 ENCOUNTER — PHARMACY VISIT (OUTPATIENT)
Dept: PHARMACY | Facility: CLINIC | Age: 25
End: 2025-01-23
Payer: COMMERCIAL

## 2025-01-23 PROCEDURE — RXMED WILLOW AMBULATORY MEDICATION CHARGE

## 2025-02-21 ENCOUNTER — APPOINTMENT (OUTPATIENT)
Dept: PRIMARY CARE | Facility: CLINIC | Age: 25
End: 2025-02-21
Payer: COMMERCIAL

## 2025-02-21 VITALS
SYSTOLIC BLOOD PRESSURE: 104 MMHG | DIASTOLIC BLOOD PRESSURE: 66 MMHG | RESPIRATION RATE: 16 BRPM | OXYGEN SATURATION: 98 % | HEIGHT: 72 IN | BODY MASS INDEX: 29.11 KG/M2 | WEIGHT: 214.9 LBS | TEMPERATURE: 97.7 F | HEART RATE: 78 BPM

## 2025-02-21 DIAGNOSIS — J45.909 UNCOMPLICATED ASTHMA, UNSPECIFIED ASTHMA SEVERITY, UNSPECIFIED WHETHER PERSISTENT (HHS-HCC): ICD-10-CM

## 2025-02-21 DIAGNOSIS — F90.0 ATTENTION DEFICIT HYPERACTIVITY DISORDER (ADHD), PREDOMINANTLY INATTENTIVE TYPE: ICD-10-CM

## 2025-02-21 DIAGNOSIS — F84.5 ASPERGER'S DISORDER: ICD-10-CM

## 2025-02-21 DIAGNOSIS — E78.1 HYPERTRIGLYCERIDEMIA: Primary | ICD-10-CM

## 2025-02-21 DIAGNOSIS — R74.8 ELEVATED LIVER ENZYMES: ICD-10-CM

## 2025-02-21 DIAGNOSIS — E55.9 VITAMIN D INSUFFICIENCY: ICD-10-CM

## 2025-02-21 PROCEDURE — 3008F BODY MASS INDEX DOCD: CPT | Performed by: INTERNAL MEDICINE

## 2025-02-21 PROCEDURE — RXMED WILLOW AMBULATORY MEDICATION CHARGE

## 2025-02-21 PROCEDURE — 1036F TOBACCO NON-USER: CPT | Performed by: INTERNAL MEDICINE

## 2025-02-21 PROCEDURE — 99214 OFFICE O/P EST MOD 30 MIN: CPT | Performed by: INTERNAL MEDICINE

## 2025-02-21 RX ORDER — ACETAMINOPHEN 500 MG
5000 TABLET ORAL DAILY
COMMUNITY

## 2025-02-21 RX ORDER — DEXMETHYLPHENIDATE HYDROCHLORIDE 10 MG/1
10 TABLET ORAL 2 TIMES DAILY
Qty: 60 TABLET | Refills: 0 | Status: SHIPPED | OUTPATIENT
Start: 2025-02-21 | End: 2025-08-24

## 2025-02-21 ASSESSMENT — LIFESTYLE VARIABLES
HOW OFTEN DO YOU HAVE A DRINK CONTAINING ALCOHOL: NEVER
AUDIT-C TOTAL SCORE: 0
HOW MANY STANDARD DRINKS CONTAINING ALCOHOL DO YOU HAVE ON A TYPICAL DAY: PATIENT DOES NOT DRINK
SKIP TO QUESTIONS 9-10: 1
HOW OFTEN DO YOU HAVE SIX OR MORE DRINKS ON ONE OCCASION: NEVER

## 2025-02-21 ASSESSMENT — PAIN SCALES - GENERAL: PAINLEVEL_OUTOF10: 0-NO PAIN

## 2025-02-21 NOTE — PROGRESS NOTES
"Chief Complaint/HPI:    Here for follow up and physical exam:      Asperger disorder, ADHD: patient used to see Santosh Ivy in peds psychiatry but he has aged out, he takes dexmethylphenidate twice daily. Has not needed to see anyone on this current dose.  Patient's mother notes that it is difficult to introduce new foods to the patient.      Elevated AST, ALT and bilirubin: Patient had testing completed by hepatology. He had labs completed.   His iron studies were within normal limits. Patient has seen Dr Tovar x 1 (hepatology), he had an slightly low  ceruloplasmin, He also had an elevated anti smooth muscle antibody, he  has had an ophthalmology evaluation which showed no evidence of Patrick's disease. The patient has a fibro scan ordered per Dr Tovar. It was completed and mother states that results were reviewed, reviewed med record, it appears that the patient is to follow up with Dr Tovar this year. They declined liver biopsy and will follow up with labs per Dr. Tovar, next due in 4/2025. Has follow up fibro scan later this year. Patient drinks \"a lot of 2 percent milk \" , he drinks up to a gallon per day. Denies pale colored stools. Reviewed notes from hepatology, emphasizing management of hyperlipidemia long term in prevention of fatty liver disease. Patient has cut down to fat free milk now.       Hypertriglyceridemia: patient takes no med therapy, TG are elevated at 198 from 127 previously.     Anti-smooth muscle antibody positive: Recent lab work showed negative DENNISE and antimitochondrial antibody but positive anti-smooth muscle antibody. This appears to be a minor issue, but may contribute to fatty liver effects.   He followed up with hepatology, fibro scan was completed.      Vitamin D deficiency: Vitamin D level of 20. Advised to take supplements over the counter at last visit. He takes 5000 unit tablets once a day.    Patient declines flu vaccine.         ROS otherwise negative aside from what was " mentioned above in HPI.      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      MEDICATIONS  Current Outpatient Medications on File Prior to Visit   Medication Sig Dispense Refill    cholecalciferol (Vitamin D3) 5,000 Units tablet Take 1 tablet (5,000 Units) by mouth once daily.      [DISCONTINUED] dexmethylphenidate (Focalin) 10 mg tablet TAKE 1 TABLET BY MOUTH TWO TIMES A DAY 60 tablet 0     No current facility-administered medications on file prior to visit.         PHYSICAL EXAM  /66 (BP Location: Left arm, Patient Position: Sitting, BP Cuff Size: Adult long)   Pulse 78   Temp 36.5 °C (97.7 °F)   Resp 16   Ht 1.829 m (6')   Wt 97.5 kg (214 lb 14.4 oz)   SpO2 98%   BMI 29.15 kg/m²   Body mass index is 29.15 kg/m².    CONSTITUTIONAL - well nourished, well developed, looks like stated age, in no acute distress, not ill-appearing, and not tired appearing, and accompanied by mother  SKIN - normal skin color and pigmentation, normal skin turgor, a few facial  lesions consistent with acne noted, no nodules visualized  HEAD - no trauma, normocephalic  EYES - extraocular muscles are intact, and normal external exam, no icterus  noted  NECK - supple without rigidity, no neck mass was observed, mild thyromegaly, no thyroid nodules, no neck masses  CHEST - clear to auscultation, no wheezing, no crackles and no rales, good effort  CARDIAC - regular rate and regular rhythm, no skipped beats, no murmur, no carotid bruits noted  ABDOMEN - slightly obese, soft, no abdominal tenderness, patient is sitting upright, limited exam.   EXTREMITIES - no edema, no deformities  NEUROLOGICAL -alert, oriented and no focal signs  PSYCHIATRIC - alert, pleasant and cordial, age-appropriate  IMMUNOLOGIC - no cervical lymphadenopathy    ASSESSMENT/PLAN  Problem List Items Addressed This Visit       Asperger's disorder    Relevant Orders    Comprehensive Metabolic Panel    CBC and Auto Differential    Albumin-Creatinine Ratio, Urine Random    Asthma    Overview     Formatting of this note might be different from the original. Inactive code replaced with active code This term is a replacement for an inactive term         Relevant Orders    Comprehensive Metabolic Panel    CBC and Auto Differential    Albumin-Creatinine Ratio, Urine Random    Attention deficit hyperactivity disorder (ADHD), predominantly inattentive type    Current Assessment & Plan     Complete controlled substance agreement today. UDS is up to date.         Relevant Medications    dexmethylphenidate (Focalin) 10 mg tablet    Other Relevant Orders    Comprehensive Metabolic Panel    CBC and Auto Differential    Albumin-Creatinine Ratio, Urine Random    Elevated liver enzymes    Current Assessment & Plan     Follows with hepatology. Mildly elevated t.bili with normal d.bili. Has follow up labs and fibro scan planned for this year.         Relevant Orders    Referral to Nutrition Services    Comprehensive Metabolic Panel    CBC and Auto Differential    Albumin-Creatinine Ratio, Urine Random    Lipid Panel    Hypertriglyceridemia - Primary    Current Assessment & Plan     Re-check fasting labs prior to  next visit. Refer to nutritional support to review diet. Mother notes that dietary changes may be difficult for the patient. He has already changed to drinking fat free milk.          Relevant Orders    Referral to Nutrition Services    Comprehensive Metabolic Panel    CBC and Auto Differential    Albumin-Creatinine Ratio, Urine Random    Lipid Panel     Other Visit Diagnoses       Vitamin D insufficiency        Relevant Orders    Vitamin D 25-Hydroxy,Total (for eval of Vitamin D levels)            Check labs prior to next visit.  Continue seeing hepatology for follow up.   Follow up in 6 months.

## 2025-02-21 NOTE — ASSESSMENT & PLAN NOTE
Re-check fasting labs prior to next visit. Refer to nutritional support to review diet. Mother notes that dietary changes may be difficult for the patient. He has already changed to drinking fat free milk.

## 2025-02-21 NOTE — ASSESSMENT & PLAN NOTE
Follows with hepatology. Mildly elevated t.bili with normal d.bili. Has follow up labs and fibro scan planned for this year.

## 2025-02-25 ENCOUNTER — PHARMACY VISIT (OUTPATIENT)
Dept: PHARMACY | Facility: CLINIC | Age: 25
End: 2025-02-25
Payer: COMMERCIAL

## 2025-03-21 ENCOUNTER — TELEMEDICINE CLINICAL SUPPORT (OUTPATIENT)
Dept: NUTRITION | Facility: HOSPITAL | Age: 25
End: 2025-03-21
Payer: COMMERCIAL

## 2025-03-21 VITALS — WEIGHT: 214 LBS | BODY MASS INDEX: 28.99 KG/M2 | HEIGHT: 72 IN

## 2025-03-21 DIAGNOSIS — E78.1 HYPERTRIGLYCERIDEMIA: Primary | ICD-10-CM

## 2025-03-21 DIAGNOSIS — R74.8 ELEVATED LIVER ENZYMES: ICD-10-CM

## 2025-03-21 PROCEDURE — 97802 MEDICAL NUTRITION INDIV IN: CPT | Mod: 95 | Performed by: DIETITIAN, REGISTERED

## 2025-03-21 NOTE — PROGRESS NOTES
Nutrition Assessment     Reason for Visit:  Hector Cruz is a 25 y.o. male who presents for telehealth nutrition counseling.    Anthropometrics:  Wt Readings from Last 10 Encounters:   03/21/25 97.1 kg (214 lb)   02/21/25 97.5 kg (214 lb 14.4 oz)   10/31/24 103 kg (228 lb)   09/26/24 102 kg (225 lb)   08/09/24 102 kg (225 lb)   02/02/24 97.5 kg (215 lb)   07/28/23 95.3 kg (210 lb)   07/06/23 96.6 kg (213 lb)   02/24/23 97.1 kg (214 lb)   08/03/22 99.8 kg (220 lb)     Lab Results   Component Value Date    CHOL 158 09/26/2024    CHOL 146 02/02/2024    CHOL 150 07/28/2023     Lab Results   Component Value Date    HDL 33.2 09/26/2024    HDL 35.9 02/02/2024    HDL 34.6 (A) 07/28/2023     Lab Results   Component Value Date    LDLCALC 85 09/26/2024    LDLCALC 85 02/02/2024     Lab Results   Component Value Date    TRIG 198 (H) 09/26/2024    TRIG 127 02/02/2024    TRIG 131 07/28/2023     Anthropometrics  Height: 182.9 cm (6')  Weight: 97.1 kg (214 lb)    Food And Nutrient Intake:  Food and Nutrient History  Food and Nutrient History: Pt presents for a telehealth nutrition visit.  Pt has dx of fatty liver and a hx of Aspergers.  Pt has elevated liver enzymes and an elevated triglyceride level. BMI indicates that he is overweight.  He has lost weight over the past 6 months.  Pt is a very picky eater and has senorty issues when it comes to food.  His diet is very limited and lacking in variety such as fruits, vegetables and whole grains.  Pt states that he does not all day until dinner and then has wto dinners.  Pt also was drinking up to a gallon of 2% milk per day.  He has switched to skim milk and cut back to a half a gallon.  Diet appears very high in fat and sodium.  Pt ealks to work in the warmer weather- 30-60 minutes per day.  A weight loss of 10% may help with fatty liver.  Suggesetd ways to add variety to his diet which included eating baby carrots on break at work and including bananas daily.  Pt becomes anxious  when discussing adding in more fruits and vegetables.  Recommend small changes and encourage a CIB supplement daily in milk and perhaps a MVI.  Food Intolerance: none     Food Intake  Meal 1: breakfast: skips  Meal 2: 5:00-- chicken tenders or turkey hot dogs; no vegetables; spaghetti  Meal 3: 7:00-10:00- 2nd dinner- hotdogs, mac n cheese, Ramen noodles; [employer]- once per week  Snacks: chips, crackers; Cheezits  Fluid Intake: water, skim milk- 1/2 gallon       Micronutrient Intake  Vitamin Intake: D    Food Supplement Intake  Vitamin Intake: D     Physical Activities:  Physical Activity  Frequency (times/week): 4 (times/week)  Duration (minutes/day): 30 minutes/day  Type of Physical Activity: exercise by walking to and from work     Knowledge Beliefs Attitudes and Behavior     Avoidance Behavior  Avoidance: Specific foods, Food groups, Textures  Restrictive Eating: No  Cause of Avoidance Behavior: Pt has Aspergers  Eating Behavior  Readiness to Consume New Food: No  Sensory Aversion to Particular Food: Texture       Nutrition Focused Physical Exam:  Deferred- telehealth visit     Energy Needs  Calculated Energy Needs Using Equations  Height: 182.9 cm (6')  Weight Used for Equation Calculations: 97.1 kg (214 lb)  [place] Equation (Overweight or Obese Patients): 2004  Activity Factor: 1.2  Total Energy Needs: 2405  Estimated Energy Needs  Total Energy Estimated Needs in 24 hours (kCal): 1904 kCal  Method for Estimating Needs: MSJ x 1.2-500 kcals/day    Nutrition Diagnosis   Malnutrition Diagnosis  Patient has Malnutrition Diagnosis: No  Nutrition Diagnosis  Patient has Nutrition Diagnosis: Yes  Diagnosis Status (1): New  Nutrition Diagnosis 1: Altered nutrition related to laboratory values  Related to (1): fatty liver; hyperlipidemia  As Evidenced by (1): elevated liver enzymes; elevated Triglycerides  Additional Nutrition Diagnosis: Diagnosis 2  Diagnosis Status (2): New  Nutrition Diagnosis 2: Predicted  inadequate nutrient intake  Related to (2): lack of variety in diet  As Evidenced by (2): diet recall    Nutrition Interventions/Recommendations   Food and Nutrition Delivery  Meals & Snacks: General Healthful Diet, Fat-modified diet  Goals: Healthy Plate Model, low-fat  Medical Food Supplement: Commercial beverage medical food supplement therapy  Goals: Try [place] Instant Breakfast in milk for breakfast or as a snack at work        Patient Instructions   Work on increasing variety in your diet.  Pick 1 or 2 vegetables to eat more of this month.   - grab some baby carrots to snack on at work   - Have a banana at breakfast  2. Consider having  [place] Instant Breakfast powder in your milk for added nutrients.  3. Try different ways of preparing vegetables such as air frying.  4. Work on reducing weight by 1# per week to lose 10-20#.     - Continue to drink fat free milk to  lower calories and saturated fat.    5.  Walk at least 30 minutes 5x/week.      Nutrition Monitoring and Evaluation   Food and Nutrient Intake  Monitoring and Evaluation Plan: Meal/snack pattern, Energy intake  Meal/Snack Pattern: Estimated meal and snack pattern  Criteria: 3 meals per day and 1-2 snacks; 7121-6088 calories per day  Biochemical Data, Medical Tests and Procedures  Monitoring and Evaluation Plan: GI profile, Lipid profile  Gastrointestinal Profile: Alanine aminotransferase (ALT), Aspartate aminotransferase (AST)  Criteria: wnl's  Criteria: wnl's.    Readiness to Change : Poor  Level of Understanding : Fair  Anticipated Compliant : Fair

## 2025-03-21 NOTE — PATIENT INSTRUCTIONS
Work on increasing variety in your diet.  Pick 1 or 2 vegetables to eat more of this month.   - grab some baby carrots to snack on at work   - Have a banana at breakfast  2. Consider having  [place] Instant Breakfast powder in your milk for added nutrients.  3. Try different ways of preparing vegetables such as air frying.  4. Work on reducing weight by 1# per week to lose 10-20#.     - Continue to drink fat free milk to  lower calories and saturated fat.    5.  Walk at least 30 minutes 5x/week.

## 2025-04-12 DIAGNOSIS — F90.0 ATTENTION DEFICIT HYPERACTIVITY DISORDER (ADHD), PREDOMINANTLY INATTENTIVE TYPE: ICD-10-CM

## 2025-04-14 PROCEDURE — RXMED WILLOW AMBULATORY MEDICATION CHARGE

## 2025-04-14 RX ORDER — DEXMETHYLPHENIDATE HYDROCHLORIDE 10 MG/1
10 TABLET ORAL 2 TIMES DAILY
Qty: 60 TABLET | Refills: 0 | Status: SHIPPED | OUTPATIENT
Start: 2025-04-14 | End: 2025-10-15

## 2025-04-15 ENCOUNTER — PHARMACY VISIT (OUTPATIENT)
Dept: PHARMACY | Facility: CLINIC | Age: 25
End: 2025-04-15
Payer: COMMERCIAL

## 2025-05-08 DIAGNOSIS — F90.0 ATTENTION DEFICIT HYPERACTIVITY DISORDER (ADHD), PREDOMINANTLY INATTENTIVE TYPE: ICD-10-CM

## 2025-05-08 RX ORDER — DEXMETHYLPHENIDATE HYDROCHLORIDE 10 MG/1
10 TABLET ORAL 2 TIMES DAILY
Qty: 60 TABLET | Refills: 0 | Status: CANCELLED | OUTPATIENT
Start: 2025-05-08 | End: 2025-11-08

## 2025-05-09 DIAGNOSIS — F90.0 ATTENTION DEFICIT HYPERACTIVITY DISORDER (ADHD), PREDOMINANTLY INATTENTIVE TYPE: ICD-10-CM

## 2025-05-09 RX ORDER — DEXMETHYLPHENIDATE HYDROCHLORIDE 10 MG/1
10 TABLET ORAL 2 TIMES DAILY
Qty: 60 TABLET | Refills: 0 | Status: SHIPPED | OUTPATIENT
Start: 2025-05-14 | End: 2025-11-14

## 2025-05-14 ENCOUNTER — PHARMACY VISIT (OUTPATIENT)
Dept: PHARMACY | Facility: CLINIC | Age: 25
End: 2025-05-14
Payer: COMMERCIAL

## 2025-05-14 PROCEDURE — RXMED WILLOW AMBULATORY MEDICATION CHARGE

## 2025-06-25 DIAGNOSIS — F90.0 ATTENTION DEFICIT HYPERACTIVITY DISORDER (ADHD), PREDOMINANTLY INATTENTIVE TYPE: ICD-10-CM

## 2025-06-25 PROCEDURE — RXMED WILLOW AMBULATORY MEDICATION CHARGE

## 2025-06-25 RX ORDER — DEXMETHYLPHENIDATE HYDROCHLORIDE 10 MG/1
10 TABLET ORAL 2 TIMES DAILY
Qty: 60 TABLET | Refills: 0 | Status: SHIPPED | OUTPATIENT
Start: 2025-06-25 | End: 2025-12-26

## 2025-06-26 ENCOUNTER — PHARMACY VISIT (OUTPATIENT)
Dept: PHARMACY | Facility: CLINIC | Age: 25
End: 2025-06-26
Payer: COMMERCIAL

## 2025-07-21 DIAGNOSIS — J45.909 UNCOMPLICATED ASTHMA, UNSPECIFIED ASTHMA SEVERITY, UNSPECIFIED WHETHER PERSISTENT (HHS-HCC): ICD-10-CM

## 2025-07-21 DIAGNOSIS — R74.8 ELEVATED LIVER ENZYMES: ICD-10-CM

## 2025-07-21 DIAGNOSIS — F84.5 ASPERGER'S DISORDER: ICD-10-CM

## 2025-07-21 DIAGNOSIS — F90.0 ATTENTION DEFICIT HYPERACTIVITY DISORDER (ADHD), PREDOMINANTLY INATTENTIVE TYPE: ICD-10-CM

## 2025-07-21 DIAGNOSIS — E78.1 HYPERTRIGLYCERIDEMIA: ICD-10-CM

## 2025-07-21 DIAGNOSIS — E55.9 VITAMIN D INSUFFICIENCY: ICD-10-CM

## 2025-08-14 DIAGNOSIS — F90.0 ATTENTION DEFICIT HYPERACTIVITY DISORDER (ADHD), PREDOMINANTLY INATTENTIVE TYPE: ICD-10-CM

## 2025-08-14 RX ORDER — DEXMETHYLPHENIDATE HYDROCHLORIDE 10 MG/1
10 TABLET ORAL 2 TIMES DAILY
Qty: 60 TABLET | Refills: 0 | Status: CANCELLED | OUTPATIENT
Start: 2025-08-14 | End: 2026-02-14

## 2025-08-18 DIAGNOSIS — F90.0 ATTENTION DEFICIT HYPERACTIVITY DISORDER (ADHD), PREDOMINANTLY INATTENTIVE TYPE: ICD-10-CM

## 2025-08-18 RX ORDER — DEXMETHYLPHENIDATE HYDROCHLORIDE 10 MG/1
10 TABLET ORAL 2 TIMES DAILY
Qty: 60 TABLET | Refills: 0 | Status: CANCELLED | OUTPATIENT
Start: 2025-08-18 | End: 2026-02-18

## 2025-08-20 RX ORDER — DEXMETHYLPHENIDATE HYDROCHLORIDE 10 MG/1
10 TABLET ORAL 2 TIMES DAILY
Qty: 60 TABLET | Refills: 0 | Status: SHIPPED | OUTPATIENT
Start: 2025-08-20 | End: 2026-02-20

## 2025-08-21 ENCOUNTER — PHARMACY VISIT (OUTPATIENT)
Dept: PHARMACY | Facility: CLINIC | Age: 25
End: 2025-08-21
Payer: COMMERCIAL

## 2025-08-21 PROCEDURE — RXMED WILLOW AMBULATORY MEDICATION CHARGE

## 2025-08-24 LAB
25(OH)D3+25(OH)D2 SERPL-MCNC: 66 NG/ML (ref 30–100)
ALBUMIN SERPL-MCNC: 4.7 G/DL (ref 3.6–5.1)
ALP SERPL-CCNC: 57 U/L (ref 36–130)
ALT SERPL-CCNC: 43 U/L (ref 9–46)
ANION GAP SERPL CALCULATED.4IONS-SCNC: 10 MMOL/L (CALC) (ref 7–17)
AST SERPL-CCNC: 30 U/L (ref 10–40)
BASOPHILS # BLD AUTO: 49 CELLS/UL (ref 0–200)
BASOPHILS NFR BLD AUTO: 0.7 %
BILIRUB SERPL-MCNC: 1.5 MG/DL (ref 0.2–1.2)
BUN SERPL-MCNC: 15 MG/DL (ref 7–25)
CALCIUM SERPL-MCNC: 9.6 MG/DL (ref 8.6–10.3)
CHLORIDE SERPL-SCNC: 104 MMOL/L (ref 98–110)
CHOLEST SERPL-MCNC: 132 MG/DL
CHOLEST/HDLC SERPL: 3.7 (CALC)
CO2 SERPL-SCNC: 26 MMOL/L (ref 20–32)
CREAT SERPL-MCNC: 0.89 MG/DL (ref 0.6–1.24)
EGFRCR SERPLBLD CKD-EPI 2021: 123 ML/MIN/1.73M2
EOSINOPHIL # BLD AUTO: 189 CELLS/UL (ref 15–500)
EOSINOPHIL NFR BLD AUTO: 2.7 %
ERYTHROCYTE [DISTWIDTH] IN BLOOD BY AUTOMATED COUNT: 13.2 % (ref 11–15)
GLUCOSE SERPL-MCNC: 94 MG/DL (ref 65–99)
HCT VFR BLD AUTO: 49.9 % (ref 38.5–50)
HDLC SERPL-MCNC: 36 MG/DL
HGB BLD-MCNC: 16.3 G/DL (ref 13.2–17.1)
LDLC SERPL CALC-MCNC: 75 MG/DL (CALC)
LYMPHOCYTES # BLD AUTO: 2240 CELLS/UL (ref 850–3900)
LYMPHOCYTES NFR BLD AUTO: 32 %
MCH RBC QN AUTO: 28.2 PG (ref 27–33)
MCHC RBC AUTO-ENTMCNC: 32.7 G/DL (ref 32–36)
MCV RBC AUTO: 86.2 FL (ref 80–100)
MONOCYTES # BLD AUTO: 672 CELLS/UL (ref 200–950)
MONOCYTES NFR BLD AUTO: 9.6 %
NEUTROPHILS # BLD AUTO: 3850 CELLS/UL (ref 1500–7800)
NEUTROPHILS NFR BLD AUTO: 55 %
NONHDLC SERPL-MCNC: 96 MG/DL (CALC)
PLATELET # BLD AUTO: 301 THOUSAND/UL (ref 140–400)
PMV BLD REES-ECKER: 10.7 FL (ref 7.5–12.5)
POTASSIUM SERPL-SCNC: 3.9 MMOL/L (ref 3.5–5.3)
PROT SERPL-MCNC: 7.5 G/DL (ref 6.1–8.1)
RBC # BLD AUTO: 5.79 MILLION/UL (ref 4.2–5.8)
SODIUM SERPL-SCNC: 140 MMOL/L (ref 135–146)
TRIGL SERPL-MCNC: 119 MG/DL
WBC # BLD AUTO: 7 THOUSAND/UL (ref 3.8–10.8)

## 2025-08-26 ENCOUNTER — TELEPHONE (OUTPATIENT)
Dept: PRIMARY CARE | Facility: CLINIC | Age: 25
End: 2025-08-26
Payer: COMMERCIAL

## 2025-08-29 ENCOUNTER — APPOINTMENT (OUTPATIENT)
Dept: PRIMARY CARE | Facility: CLINIC | Age: 25
End: 2025-08-29
Payer: COMMERCIAL

## 2025-08-29 VITALS
RESPIRATION RATE: 21 BRPM | HEART RATE: 74 BPM | TEMPERATURE: 97.5 F | WEIGHT: 202 LBS | SYSTOLIC BLOOD PRESSURE: 132 MMHG | DIASTOLIC BLOOD PRESSURE: 79 MMHG | OXYGEN SATURATION: 99 % | HEIGHT: 71 IN | BODY MASS INDEX: 28.28 KG/M2

## 2025-08-29 DIAGNOSIS — F84.5 ASPERGER'S DISORDER: ICD-10-CM

## 2025-08-29 DIAGNOSIS — E55.9 VITAMIN D INSUFFICIENCY: ICD-10-CM

## 2025-08-29 DIAGNOSIS — Z00.00 ANNUAL PHYSICAL EXAM: Primary | ICD-10-CM

## 2025-08-29 DIAGNOSIS — F90.0 ATTENTION DEFICIT HYPERACTIVITY DISORDER (ADHD), PREDOMINANTLY INATTENTIVE TYPE: ICD-10-CM

## 2025-08-29 DIAGNOSIS — E78.2 MIXED HYPERLIPIDEMIA: ICD-10-CM

## 2025-08-29 DIAGNOSIS — K76.0 FATTY LIVER: ICD-10-CM

## 2025-08-29 PROCEDURE — 3008F BODY MASS INDEX DOCD: CPT | Performed by: INTERNAL MEDICINE

## 2025-08-29 PROCEDURE — 99395 PREV VISIT EST AGE 18-39: CPT | Performed by: INTERNAL MEDICINE

## 2025-08-29 PROCEDURE — 1036F TOBACCO NON-USER: CPT | Performed by: INTERNAL MEDICINE

## 2025-08-29 RX ORDER — DEXMETHYLPHENIDATE HYDROCHLORIDE 10 MG/1
10 TABLET ORAL 2 TIMES DAILY
Qty: 60 TABLET | Refills: 0 | Status: SHIPPED | OUTPATIENT
Start: 2025-09-20 | End: 2026-03-23

## 2025-08-29 SDOH — ECONOMIC STABILITY: FOOD INSECURITY: WITHIN THE PAST 12 MONTHS, YOU WORRIED THAT YOUR FOOD WOULD RUN OUT BEFORE YOU GOT MONEY TO BUY MORE.: NEVER TRUE

## 2025-08-29 SDOH — ECONOMIC STABILITY: FOOD INSECURITY: WITHIN THE PAST 12 MONTHS, THE FOOD YOU BOUGHT JUST DIDN'T LAST AND YOU DIDN'T HAVE MONEY TO GET MORE.: NEVER TRUE

## 2025-08-29 ASSESSMENT — PAIN SCALES - GENERAL: PAINLEVEL_OUTOF10: 0-NO PAIN

## 2025-08-29 ASSESSMENT — ENCOUNTER SYMPTOMS
LOSS OF SENSATION IN FEET: 0
DEPRESSION: 0

## 2025-08-29 ASSESSMENT — LIFESTYLE VARIABLES
HOW OFTEN DO YOU HAVE A DRINK CONTAINING ALCOHOL: NEVER
AUDIT-C TOTAL SCORE: 0
HOW MANY STANDARD DRINKS CONTAINING ALCOHOL DO YOU HAVE ON A TYPICAL DAY: PATIENT DOES NOT DRINK
HOW OFTEN DO YOU HAVE SIX OR MORE DRINKS ON ONE OCCASION: NEVER
SKIP TO QUESTIONS 9-10: 1

## 2025-08-29 ASSESSMENT — PATIENT HEALTH QUESTIONNAIRE - PHQ9
2. FEELING DOWN, DEPRESSED OR HOPELESS: NOT AT ALL
1. LITTLE INTEREST OR PLEASURE IN DOING THINGS: NOT AT ALL
SUM OF ALL RESPONSES TO PHQ9 QUESTIONS 1 & 2: 0

## 2025-08-30 LAB
AMPHETAMINES UR QL: NEGATIVE NG/ML
Lab: NORMAL

## 2025-11-20 ENCOUNTER — APPOINTMENT (OUTPATIENT)
Facility: CLINIC | Age: 25
End: 2025-11-20
Payer: COMMERCIAL

## 2026-03-06 ENCOUNTER — APPOINTMENT (OUTPATIENT)
Dept: PRIMARY CARE | Facility: CLINIC | Age: 26
End: 2026-03-06
Payer: COMMERCIAL